# Patient Record
Sex: MALE | Race: WHITE | NOT HISPANIC OR LATINO | Employment: FULL TIME | URBAN - METROPOLITAN AREA
[De-identification: names, ages, dates, MRNs, and addresses within clinical notes are randomized per-mention and may not be internally consistent; named-entity substitution may affect disease eponyms.]

---

## 2023-08-24 ENCOUNTER — OFFICE VISIT (OUTPATIENT)
Dept: URGENT CARE | Facility: URGENT CARE | Age: 33
End: 2023-08-24
Payer: COMMERCIAL

## 2023-08-24 VITALS
OXYGEN SATURATION: 97 % | SYSTOLIC BLOOD PRESSURE: 134 MMHG | HEART RATE: 82 BPM | TEMPERATURE: 98.4 F | WEIGHT: 241 LBS | DIASTOLIC BLOOD PRESSURE: 76 MMHG

## 2023-08-24 DIAGNOSIS — J02.9 PHARYNGITIS, UNSPECIFIED ETIOLOGY: Primary | ICD-10-CM

## 2023-08-24 LAB
DEPRECATED S PYO AG THROAT QL EIA: NEGATIVE
GROUP A STREP BY PCR: NOT DETECTED

## 2023-08-24 PROCEDURE — 99203 OFFICE O/P NEW LOW 30 MIN: CPT

## 2023-08-24 PROCEDURE — 87651 STREP A DNA AMP PROBE: CPT

## 2023-08-24 RX ORDER — FLUTICASONE PROPIONATE AND SALMETEROL 250; 50 UG/1; UG/1
1 POWDER RESPIRATORY (INHALATION) EVERY 12 HOURS
COMMUNITY
Start: 2023-08-09 | End: 2023-11-10

## 2023-08-25 NOTE — PROGRESS NOTES
URGENT CARE  Assessment & Plan   Assessment:   Efren Rosas is a 32 year old male who's clinical presentation today is consistent with:   1. Pharyngitis, unspecified etiology  - Streptococcus A Rapid Screen w/Reflex to PCR  Plan:  Will treat patient with supportive and symptomatic measures for pharyngitis at this time which include: Fluids, rest, over-the-counter medications; , decongestants, antihistamines, and expectorants, side effects of medications reviewed. Educated patient that honey, warm fluids and gargling saltwater can also help  additionally tested for bacterial cause and rapid test was negative for streptococcal A; PCR test pending (updated pt results will come via mychart/call and rx will be reflexed if positive), additionally, educated patient to monitor their symptoms for improvement over the next week and to return for a follow up if the sore throat and/or tonsil swelling does not improve in the next 5-7 days. Educated patient on the warning signs of a peritonsillar abscess and to report to the emergency department if she notices any of these (trismus, dysphonia, uvular deviation, unilateral edema of peritonsillar region    No alarm signs or symptoms present   Differential Diagnoses for this patient's chief complaint that I considered include:  Bacterial vs viral etiology of pharyngitis, peritonsillar abscess, Tonsillitis, mono      Patient is agreeable to treatment plan and state they will follow-up if symptoms do not improve and/or if symptoms worsen   see patient's AVS 'monitor for' section for specific patient instructions given and discussed regarding what to watch for and when to follow up    NIRAJ [facility]unders [facility] URGENT CARE [place]      ______________________________________________________________________      Subjective     HPI: Efren Rosas  is a 32 year old  male who presents today for evaluation the following concerns:   Patient endorses a sore throat  today which started 3 days ago on 8/21/23   Patient reports they have been experiencing a sore throat and swollen lymph nodes    Patient  denies} a history of strep throat   Patient denies any fevers} sweats, chills, myalgias, wheezing, shortness of breath, difficulty breathing, chest pain, weakness or signs of dehydration   Patient denies any difficulty opening jaw, dysphonia/voice changes, uvular deviation, or unilateral edema of peritonsillar region    Review of Systems:  Pertinent review of systems as reflected in HPI, otherwise negative.     Objective    Physical Exam:  Vitals:    08/24/23 1849   BP: 134/76   Pulse: 82   Temp: 98.4  F (36.9  C)   TempSrc: Tympanic   SpO2: 97%   Weight: 109.3 kg (241 lb)      General: Alert and oriented, no acute distress, Vital signs reviewed: afebrile,  normotensive   Psy/mental status: Cooperative, nonanxious  SKIN: Intact, no rashes  EYES: EOMs intact, PERRLA bilaterally   Conjunctiva: Clear bilaterally, no injection or erythema present  EARS: TMs intact, translucent gray in color with normal landmarks present no erythema  or bulging tympanic membrane   Canals are without swelling, however have a mild amount of cerumen, no impaction  NOSE:  mucosa moist               No frontal or maxillary sinus tenderness present bilaterally  MOUTH/THROAT: lips, tongue, & oral mucosa appear normal upon inspection                Posterior oropharynx is erythematous but without exudate, lesions or tonsillar  Edema, no dysphonia, no unilateral tonsillar edema, no uvular deviation,   no signs of peritonsillar abscess  NECK: supple, has full range of motion with no meningeal signs              No lymphadenopathy present  LUNG: normal work of breathing, good respiratory effort without retractions, good air  movement, non labored, inspection reveals normal chest expansion w/  inspiration            Lung sounds are clear to auscultation bilaterally,            No rales/rhonic/crackles wheezing  noted           No cough noted      LABS:   Results for orders placed or performed in visit on 08/24/23   Streptococcus A Rapid Screen w/Reflex to PCR - Clinic Collect     Status: Normal    Specimen: Throat; Swab   Result Value Ref Range    Group A Strep antigen Negative Negative        ______________________________________________________________________    I explained my diagnostic considerations and recommendations to the patient, who voiced understanding and agreement with the treatment plan.   All questions were answered.   We discussed potential side effects, risks and benefits of any prescribed or recommended therapies, as well as expectations for response to treatments.  Please see AVS for any patient instructions & handouts given.   Patient was advised to contact the Nurse Care Line, their Primary Care provider, Urgent Care, or the Emergency Department if there are new or worsening symptoms, or call 911 for emergencies.

## 2023-08-27 ENCOUNTER — HEALTH MAINTENANCE LETTER (OUTPATIENT)
Age: 33
End: 2023-08-27

## 2023-09-06 ENCOUNTER — OFFICE VISIT (OUTPATIENT)
Dept: FAMILY MEDICINE | Facility: CLINIC | Age: 33
End: 2023-09-06
Payer: COMMERCIAL

## 2023-09-06 VITALS
DIASTOLIC BLOOD PRESSURE: 70 MMHG | SYSTOLIC BLOOD PRESSURE: 114 MMHG | RESPIRATION RATE: 16 BRPM | HEART RATE: 90 BPM | HEIGHT: 72 IN | TEMPERATURE: 98.6 F | BODY MASS INDEX: 32.51 KG/M2 | OXYGEN SATURATION: 98 % | WEIGHT: 240 LBS

## 2023-09-06 DIAGNOSIS — R53.83 OTHER FATIGUE: ICD-10-CM

## 2023-09-06 DIAGNOSIS — R06.83 SNORING: ICD-10-CM

## 2023-09-06 DIAGNOSIS — J02.9 PHARYNGITIS, UNSPECIFIED ETIOLOGY: ICD-10-CM

## 2023-09-06 DIAGNOSIS — Z00.00 WELL ADULT EXAM: Primary | ICD-10-CM

## 2023-09-06 LAB
ERYTHROCYTE [DISTWIDTH] IN BLOOD BY AUTOMATED COUNT: 13 % (ref 10–15)
HCT VFR BLD AUTO: 43.2 % (ref 40–53)
HGB BLD-MCNC: 14.4 G/DL (ref 13.3–17.7)
MCH RBC QN AUTO: 29.7 PG (ref 26.5–33)
MCHC RBC AUTO-ENTMCNC: 33.3 G/DL (ref 31.5–36.5)
MCV RBC AUTO: 89 FL (ref 78–100)
PLATELET # BLD AUTO: 248 10E3/UL (ref 150–450)
RBC # BLD AUTO: 4.85 10E6/UL (ref 4.4–5.9)
TSH SERPL DL<=0.005 MIU/L-ACNC: 1.61 UIU/ML (ref 0.3–4.2)
WBC # BLD AUTO: 10.6 10E3/UL (ref 4–11)

## 2023-09-06 PROCEDURE — 85027 COMPLETE CBC AUTOMATED: CPT | Performed by: FAMILY MEDICINE

## 2023-09-06 PROCEDURE — 36415 COLL VENOUS BLD VENIPUNCTURE: CPT | Performed by: FAMILY MEDICINE

## 2023-09-06 PROCEDURE — 84443 ASSAY THYROID STIM HORMONE: CPT | Performed by: FAMILY MEDICINE

## 2023-09-06 PROCEDURE — 99213 OFFICE O/P EST LOW 20 MIN: CPT | Mod: 25 | Performed by: FAMILY MEDICINE

## 2023-09-06 PROCEDURE — 99385 PREV VISIT NEW AGE 18-39: CPT | Performed by: FAMILY MEDICINE

## 2023-09-06 RX ORDER — AMOXICILLIN 500 MG/1
500 CAPSULE ORAL 3 TIMES DAILY
Qty: 30 CAPSULE | Refills: 0 | Status: SHIPPED | OUTPATIENT
Start: 2023-09-06 | End: 2023-12-11

## 2023-09-06 ASSESSMENT — ENCOUNTER SYMPTOMS
EYE PAIN: 0
JOINT SWELLING: 0
FEVER: 0
DIARRHEA: 0
NERVOUS/ANXIOUS: 0
DIZZINESS: 0
CHILLS: 0
HEARTBURN: 0
ARTHRALGIAS: 0
CONSTIPATION: 0
SHORTNESS OF BREATH: 0
HEADACHES: 0
HEMATOCHEZIA: 0
MYALGIAS: 0
DYSURIA: 0
PALPITATIONS: 0
COUGH: 0
ABDOMINAL PAIN: 0
WEAKNESS: 1
HEMATURIA: 0
FREQUENCY: 0
PARESTHESIAS: 0
NAUSEA: 0
SORE THROAT: 1

## 2023-09-06 ASSESSMENT — PAIN SCALES - GENERAL: PAINLEVEL: MODERATE PAIN (5)

## 2023-09-06 NOTE — NURSING NOTE
"Chief Complaint   Patient presents with    Physical       Initial /70   Pulse 90   Temp 98.6  F (37  C) (Tympanic)   Resp 16   Ht 1.816 m (5' 11.5\")   Wt 108.9 kg (240 lb)   SpO2 98%   BMI 33.01 kg/m   Estimated body mass index is 33.01 kg/m  as calculated from the following:    Height as of this encounter: 1.816 m (5' 11.5\").    Weight as of this encounter: 108.9 kg (240 lb).    Patient presents to the clinic using No DME    Is there anyone who you would like to be able to receive your results? No  If yes have patient fill out ALICIA      "

## 2023-09-06 NOTE — PROGRESS NOTES
"SUBJECTIVE:   CC: Efren is an 32 year old who presents for preventative health visit.       2023     5:07 PM   Additional Questions   Roomed by Yoselin BALLESTEROS   Accompanied by self       Healthy Habits:     Getting at least 3 servings of Calcium per day:  NO    Bi-annual eye exam:  Yes    Dental care twice a year:  NO    Sleep apnea or symptoms of sleep apnea:  Daytime drowsiness, Excessive snoring and Sleep apnea    Diet:  Regular (no restrictions)    Frequency of exercise:  1 day/week    Duration of exercise:  30-45 minutes    Taking medications regularly:  Yes    Additional concerns today:  Yes      Today's PHQ-2 Score:       2023     5:02 PM   PHQ-2 (  [employer])   Q1: Little interest or pleasure in doing things 0   Q2: Feeling down, depressed or hopeless 0   PHQ-2 Score 0   Q1: Little interest or pleasure in doing things Not at all   Q2: Feeling down, depressed or hopeless Not at all   PHQ-2 Score 0     Fatigue, snoring, apnea: wife notices.  Tired all the time.  Thinks sleep eval makes sense  Sore throat: R side.  Feels like it's \"full\" sometimes.  Hurts to swallow, comes and goes.  Strep neg in UC. Still persisting.  Sometimes pain with swallowing          : No, advance care planning information given to patient to review.  Patient plans to discuss their wishes with loved ones or provider.      Social History     Tobacco Use    Smoking status: Former     Packs/day: 0.25     Years: 4.00     Pack years: 1.00     Types: Cigarettes     Quit date: 2/15/2021     Years since quittin.5    Smokeless tobacco: Never   Substance Use Topics    Alcohol use: No         2023     5:02 PM   Alcohol Use   Prescreen: >3 drinks/day or >7 drinks/week? No          No data to display                Last PSA: No results found for: PSA    Reviewed orders with patient. Reviewed health maintenance and updated orders accordingly -       Reviewed and updated as needed this visit by clinical staff   Tobacco  Allergies  Meds " "             Reviewed and updated as needed this visit by Provider      Review of Systems   Constitutional:  Negative for chills and fever.   HENT:  Positive for sore throat. Negative for congestion, ear pain and hearing loss.    Eyes:  Negative for pain and visual disturbance.   Respiratory:  Negative for cough and shortness of breath.    Cardiovascular:  Negative for chest pain, palpitations and peripheral edema.   Gastrointestinal:  Negative for abdominal pain, constipation, diarrhea, heartburn, hematochezia and nausea.   Genitourinary:  Negative for dysuria, frequency, genital sores, hematuria, impotence, penile discharge and urgency.   Musculoskeletal:  Negative for arthralgias, joint swelling and myalgias.   Skin:  Negative for rash.   Neurological:  Positive for weakness. Negative for dizziness, headaches and paresthesias.   Psychiatric/Behavioral:  Negative for mood changes. The patient is not nervous/anxious.          OBJECTIVE:   /70   Pulse 90   Temp 98.6  F (37  C) (Tympanic)   Resp 16   Ht 1.816 m (5' 11.5\")   Wt 108.9 kg (240 lb)   SpO2 98%   BMI 33.01 kg/m    Gen: alert and oriented, in no acute distress, affect within normal limits  Neck: supple with no masses or nodes.  Some tenderness R side of neck, just off thyroid.  No obvious mass.    Throat: oropharynx clear, no exudate or tonsillar/palate asymmetry.    CV: RRR, no murmur  Lungs: clear bilaterally with good effort  Abd: nontender, no mass  Ext: no edema or lesions   Neuro: moving all extremities, gait normal, no focal deficts noted      ASSESSMENT/PLAN:   Well exam  Throat pain, nos  Fatigue, apnea    Cbc, TSH.  Empiric trial of amox.  Consider ENT if not improving    Reviewed lipids, met panel from a year ago, don';t need to repeat those        COUNSELING:   Reviewed preventive health counseling, as reflected in patient instructions       Regular exercise       Healthy diet/nutrition        He reports that he quit smoking about " 2 years ago. His smoking use included cigarettes. He has a 1.00 pack-year smoking history. He has never used smokeless tobacco.            Hola Marquez MD  [facility]

## 2023-11-09 DIAGNOSIS — J45.30 MILD PERSISTENT ASTHMA, UNSPECIFIED WHETHER COMPLICATED: Primary | ICD-10-CM

## 2023-11-09 NOTE — TELEPHONE ENCOUNTER
Routing refill request to provider for review/approval because:  Medication is reported/historical    Date of last OV with Dr. Hola Marquez 9/6/23  .    Julie Behrendt RN

## 2023-11-10 RX ORDER — FLUTICASONE PROPIONATE AND SALMETEROL 50; 250 UG/1; UG/1
POWDER RESPIRATORY (INHALATION)
Qty: 3 EACH | Refills: 3 | Status: SHIPPED | OUTPATIENT
Start: 2023-11-10 | End: 2024-09-05 | Stop reason: ALTCHOICE

## 2023-11-29 ENCOUNTER — HOSPITAL ENCOUNTER (OUTPATIENT)
Dept: CT IMAGING | Facility: CLINIC | Age: 33
Discharge: HOME | End: 2023-11-29
Attending: NURSE PRACTITIONER | Admitting: NURSE PRACTITIONER
Payer: COMMERCIAL

## 2023-11-29 ENCOUNTER — OFFICE VISIT (OUTPATIENT)
Dept: FAMILY MEDICINE | Facility: CLINIC | Age: 33
End: 2023-11-29
Payer: COMMERCIAL

## 2023-11-29 VITALS
BODY MASS INDEX: 32.51 KG/M2 | OXYGEN SATURATION: 97 % | WEIGHT: 240 LBS | HEART RATE: 84 BPM | TEMPERATURE: 98.4 F | HEIGHT: 72 IN | SYSTOLIC BLOOD PRESSURE: 128 MMHG | DIASTOLIC BLOOD PRESSURE: 84 MMHG | RESPIRATION RATE: 16 BRPM

## 2023-11-29 DIAGNOSIS — R31.9 HEMATURIA, UNSPECIFIED TYPE: Primary | ICD-10-CM

## 2023-11-29 LAB
ALBUMIN UR-MCNC: NEGATIVE MG/DL
APPEARANCE UR: CLEAR
BACTERIA #/AREA URNS HPF: ABNORMAL /HPF
BILIRUB UR QL STRIP: NEGATIVE
COLOR UR AUTO: YELLOW
GLUCOSE UR STRIP-MCNC: NEGATIVE MG/DL
HGB UR QL STRIP: ABNORMAL
KETONES UR STRIP-MCNC: NEGATIVE MG/DL
LEUKOCYTE ESTERASE UR QL STRIP: NEGATIVE
NITRATE UR QL: NEGATIVE
PH UR STRIP: 5.5 [PH] (ref 5–7)
RBC #/AREA URNS AUTO: ABNORMAL /HPF
SP GR UR STRIP: <=1.005 (ref 1–1.03)
SQUAMOUS #/AREA URNS AUTO: ABNORMAL /LPF
UROBILINOGEN UR STRIP-ACNC: 0.2 E.U./DL
WBC #/AREA URNS AUTO: ABNORMAL /HPF

## 2023-11-29 PROCEDURE — 99215 OFFICE O/P EST HI 40 MIN: CPT | Performed by: NURSE PRACTITIONER

## 2023-11-29 PROCEDURE — 74176 CT ABD & PELVIS W/O CONTRAST: CPT

## 2023-11-29 PROCEDURE — 81001 URINALYSIS AUTO W/SCOPE: CPT | Performed by: NURSE PRACTITIONER

## 2023-11-29 ASSESSMENT — PAIN SCALES - GENERAL: PAINLEVEL: MODERATE PAIN (5)

## 2023-11-29 ASSESSMENT — ASTHMA QUESTIONNAIRES: ACT_TOTALSCORE: 21

## 2023-11-29 NOTE — COMMUNITY RESOURCES LIST (ENGLISH)
11/29/2023   [facility]  N/A  For questions about this resource list or additional care needs, please contact your primary care clinic or care manager.  Phone: [phone]   Email: N/A   Address: [address] [place] [address]   Hours: N/A        Food and Nutrition       Food pantry  1  [employer] Distance: 1.44 miles      In-Person   [address] [place] [address]  Language: English  Hours: Sat 8:30 AM - 11:30 AM  Fees: Free   Phone: (103) 767-6614 Email: contact@[employer] Website: [email or web address]/     2  [employer] [facility] Distance: 2.12 miles      Pickup   [address] [place] [address]  Language: English  Hours: Mon 9:00 AM - 5:00 PM , Wed 9:00 AM - 5:00 PM , Fri 9:00 AM - 5:00 PM  Fees: Free   Phone: (281) 212-2756 Email: mail@[employer]yte Website: https://www.[employer]/our-work/food-access-and-equity/     SNAP application assistance  3  [facility] ([facility]) - [place] Office Distance: 2.16 miles      In-Person, Phone/Virtual   [address] [place] [address]  Language: English  Hours: Mon - Fri 8:00 AM - 4:30 PM  Fees: Free   Phone: (891) 634-2150 Email: ngoc@[facility].[employer] Website: https://www.[facility].org/agency-information     4  [facility] - [place] Family Investment Program (MFIP) - [place] Family Investment Program (MFIP) - SNAP application assistance Distance: 11.41 miles      In-Person, Phone/Virtual   [address] [address]  Language: English  Hours: Mon - Fri 8:00 AM - 4:30 PM  Fees: Free   Phone: (246) 950-5985 Email: jan@[employer].gov Website: https://www.[facility]./499/MFIP-Biennial-Service-Agreement-VCA-Plan          Important Numbers & Websites       Emergency Services   911  [facility]   311  Poison Control   (800)  760-9054  Suicide Prevention Lifeline   (715) 403-7366 (TALK)  Child Abuse Hotline   (190) 821-4035 (4-A-Child)  Sexual Assault Hotline   (917) 183-5618 (HOPE)  National Runaway Safeline   (566) 395-1733 (RUNAWAY)  All-Options Talkline   (702) 780-6240  Substance Abuse Referral   (768) 118-9823 (HELP)

## 2023-11-29 NOTE — COMMUNITY RESOURCES LIST (ENGLISH)
11/29/2023   [facility]  N/A  For questions about this resource list or additional care needs, please contact your primary care clinic or care manager.  Phone: [phone]   Email: N/A   Address: [address] [place] [address]   Hours: N/A        Food and Nutrition       Food pantry  1  [employer] Distance: 1.44 miles      In-Person   [address] [place] [address]  Language: English  Hours: Sat 8:30 AM - 11:30 AM  Fees: Free   Phone: (624) 182-5285 Email: contact@[employer] Website: http://[employer]Parenthoods/     2  [employer] Food [facility] Distance: 2.12 miles      Pickup   [address] [place] [address]  Language: English  Hours: Mon 9:00 AM - 5:00 PM , Wed 9:00 AM - 5:00 PM , Fri 9:00 AM - 5:00 PM  Fees: Free   Phone: (376) 444-1205 Email: mail@[employer]arenthoods Website: https://www.[employer]/our-work/food-access-and-equity/     SNAP application assistance  3  [facility] ([facility]) - [place] Office Distance: 2.16 miles      In-Person, Phone/Virtual   [address] [place] [address]  Language: English  Hours: Mon - Fri 8:00 AM - 4:30 PM  Fees: Free   Phone: (293) 932-9958 Email: ngoc@[facility].Parenthoods Website: https://www.[facility].org/agency-information     4  [facility] - [place] Family Investment Program (MFIP) - [place] Family Investment Program (MFIP) - SNAP application assistance Distance: 11.41 miles      In-Person, Phone/Virtual   [address] [address]  Language: English  Hours: Mon - Fri 8:00 AM - 4:30 PM  Fees: Free   Phone: (170) 505-4852 Email: jan@[facility].gov Website: https://www.[facility]./499/MFIP-Biennial-Service-Agreement-VCA-Plan          Important Numbers & Websites       Emergency Services   911  [facility] Services   311  Poison Control   (800)  488-2667  Suicide Prevention Lifeline   (281) 576-9062 (TALK)  Child Abuse Hotline   (892) 374-2670 (4-A-Child)  Sexual Assault Hotline   (830) 508-1518 (HOPE)  National Runaway Safeline   (437) 887-8910 (RUNAWAY)  All-Options Talkline   (244) 678-5240  Substance Abuse Referral   (741) 535-6420 (HELP)

## 2023-11-29 NOTE — PROGRESS NOTES
"  Assessment & Plan     Hematuria, unspecified type  Patient reports gross hematuria, however no red cells on micro today.  Given pain he was experiencing, stat CT was obtained to rule out nephrolithiasis.  CT is negative for stone or other intra-abdominal pathology, although I suspect he may have passed a stone.  There is a tiny abrasion at the urinary meatus, could be the source of his current pain.  UA is not consistent with infection.  Advised to using Vaseline at the tip of the penis.  He will let me know if things are not improving.  - UA Macroscopic with reflex to Microscopic and Culture - Clinic Collect  - UA Microscopic with Reflex to Culture  - CT Abdomen Pelvis w/o Contrast       BMI:   Estimated body mass index is 33.01 kg/m  as calculated from the following:    Height as of this encounter: 1.816 m (5' 11.5\").    Weight as of this encounter: 108.9 kg (240 lb).       See Patient Instructions    NIRAJ [facility]    Kelly Schwartz is a 32 year old, presenting for the following health issues:  Hematuria        11/29/2023     9:16 AM   Additional Questions   Roomed by Monica STAFFORD   Accompanied by self       History of Present Illness       Reason for visit:  Blood in urine  Symptom onset:  1-3 days ago  Symptom intensity:  Moderate  Symptom progression:  Worsening  Had these symptoms before:  No    He eats 2-3 servings of fruits and vegetables daily.He consumes 2 sweetened beverage(s) daily.He exercises with enough effort to increase his heart rate 30 to 60 minutes per day.  He exercises with enough effort to increase his heart rate 3 or less days per week.   He is taking medications regularly.       Also mentions he has had some abdominal pain, felt a stinging while he was urinating just one time.    Above HPI reviewed. Additionally, notes that yesterday he noticed blood in his underwear.  He subsequently had pain near the tip of his penis after urination.  He " "then continued to note blood in his urine over the past 24 hours.  He states that it feels like he is \"trying to pee past something that is blocked\".  He denies any flank or abdominal pain.  He has not had any fevers.  He denies penile discharge.  He denies concern for STI.          Review of Systems   Constitutional, HEENT, cardiovascular, pulmonary, gi and gu systems are negative, except as otherwise noted.      Objective    /84   Pulse 84   Temp 98.4  F (36.9  C) (Tympanic)   Resp 16   Ht 1.816 m (5' 11.5\")   Wt 108.9 kg (240 lb)   SpO2 97%   BMI 33.01 kg/m    Body mass index is 33.01 kg/m .  Physical Exam  Vitals and nursing note reviewed.   Constitutional:       Appearance: Normal appearance.   HENT:      Head: Normocephalic and atraumatic.      Mouth/Throat:      Mouth: Mucous membranes are moist.   Cardiovascular:      Rate and Rhythm: Normal rate.   Pulmonary:      Effort: Pulmonary effort is normal.   Abdominal:      Tenderness: There is no right CVA tenderness or left CVA tenderness.   Genitourinary:     Testes: Normal.         Right: Mass, tenderness, testicular hydrocele or varicocele not present. Right testis is descended.         Left: Mass, tenderness, swelling or varicocele not present.      Epididymis:      Right: Normal.      Left: Normal.      Comments: Small abrasion noted at urinary meatus  Musculoskeletal:      Cervical back: Neck supple.   Skin:     General: Skin is warm and dry.   Neurological:      General: No focal deficit present.      Mental Status: He is alert.   Psychiatric:         Mood and Affect: Mood normal.         Behavior: Behavior normal.            Results for orders placed or performed in visit on 11/29/23 (from the past 24 hour(s))   UA Macroscopic with reflex to Microscopic and Culture - Clinic Collect    Specimen: Urine, Midstream   Result Value Ref Range    Color Urine Yellow Colorless, Straw, Light Yellow, Yellow    Appearance Urine Clear Clear    Glucose " Urine Negative Negative mg/dL    Bilirubin Urine Negative Negative    Ketones Urine Negative Negative mg/dL    Specific Gravity Urine <=1.005 1.003 - 1.035    Blood Urine Trace (A) Negative    pH Urine 5.5 5.0 - 7.0    Protein Albumin Urine Negative Negative mg/dL    Urobilinogen Urine 0.2 0.2, 1.0 E.U./dL    Nitrite Urine Negative Negative    Leukocyte Esterase Urine Negative Negative   UA Microscopic with Reflex to Culture   Result Value Ref Range    Bacteria Urine Few (A) None Seen /HPF    RBC Urine 0-2 0-2 /HPF /HPF    WBC Urine None Seen 0-5 /HPF /HPF    Squamous Epithelials Urine Few (A) None Seen /LPF    Narrative    Urine Culture not indicated   CT Abdomen Pelvis w/o Contrast    Narrative    CT ABDOMEN AND PELVIS WITHOUT CONTRAST 11/29/2023 10:15 AM    CLINICAL HISTORY: Suspected kidney stone. Gross hematuria.    TECHNIQUE: CT scan of the abdomen and pelvis was performed without IV  contrast. Multiplanar reformats were obtained. Dose reduction  techniques were used.  CONTRAST: None.    COMPARISON: None.    FINDINGS:   LOWER CHEST: Normal.    HEPATOBILIARY: Diffuse hepatic steatosis. No significant mass. No bile  duct dilatation. No calcified gallstones.    PANCREAS: Normal.    SPLEEN: Normal.    ADRENAL GLANDS: Normal.    KIDNEYS/BLADDER: No significant mass, stones, or hydronephrosis.    BOWEL: No obstruction or inflammatory change. Appendix is absent.    LYMPH NODES: Normal.    VASCULATURE: Unremarkable.    PELVIC ORGANS: Normal.    OTHER: None.    MUSCULOSKELETAL: Normal.      Impression    IMPRESSION:   1.  No acute intra-abdominal or intrapelvic process.  2.  No KUB calculi or hydronephrosis.  3.  Hepatic steatosis.    EDWIN MCKEON MD         SYSTEM ID:  [id number]     CT Abdomen Pelvis w/o Contrast    Result Date: 11/29/2023  CT ABDOMEN AND PELVIS WITHOUT CONTRAST 11/29/2023 10:15 AM CLINICAL HISTORY: Suspected kidney stone. Gross hematuria. TECHNIQUE: CT scan of the abdomen and pelvis was performed  without IV contrast. Multiplanar reformats were obtained. Dose reduction techniques were used. CONTRAST: None. COMPARISON: None. FINDINGS: LOWER CHEST: Normal. HEPATOBILIARY: Diffuse hepatic steatosis. No significant mass. No bile duct dilatation. No calcified gallstones. PANCREAS: Normal. SPLEEN: Normal. ADRENAL GLANDS: Normal. KIDNEYS/BLADDER: No significant mass, stones, or hydronephrosis. BOWEL: No obstruction or inflammatory change. Appendix is absent. LYMPH NODES: Normal. VASCULATURE: Unremarkable. PELVIC ORGANS: Normal. OTHER: None. MUSCULOSKELETAL: Normal.     IMPRESSION: 1.  No acute intra-abdominal or intrapelvic process. 2.  No KUB calculi or hydronephrosis. 3.  Hepatic steatosis. EDWIN MCKEON MD   SYSTEM ID:  [id number]

## 2023-12-04 ASSESSMENT — SLEEP AND FATIGUE QUESTIONNAIRES
HOW LIKELY ARE YOU TO NOD OFF OR FALL ASLEEP WHILE SITTING AND READING: SLIGHT CHANCE OF DOZING
HOW LIKELY ARE YOU TO NOD OFF OR FALL ASLEEP WHILE SITTING INACTIVE IN A PUBLIC PLACE: WOULD NEVER DOZE
HOW LIKELY ARE YOU TO NOD OFF OR FALL ASLEEP WHILE WATCHING TV: MODERATE CHANCE OF DOZING
HOW LIKELY ARE YOU TO NOD OFF OR FALL ASLEEP IN A CAR, WHILE STOPPED FOR A FEW MINUTES IN TRAFFIC: WOULD NEVER DOZE
HOW LIKELY ARE YOU TO NOD OFF OR FALL ASLEEP WHILE SITTING QUIETLY AFTER LUNCH WITHOUT ALCOHOL: WOULD NEVER DOZE
HOW LIKELY ARE YOU TO NOD OFF OR FALL ASLEEP WHILE SITTING AND TALKING TO SOMEONE: WOULD NEVER DOZE
HOW LIKELY ARE YOU TO NOD OFF OR FALL ASLEEP WHEN YOU ARE A PASSENGER IN A CAR FOR AN HOUR WITHOUT A BREAK: SLIGHT CHANCE OF DOZING
HOW LIKELY ARE YOU TO NOD OFF OR FALL ASLEEP WHILE LYING DOWN TO REST IN THE AFTERNOON WHEN CIRCUMSTANCES PERMIT: SLIGHT CHANCE OF DOZING

## 2023-12-11 ENCOUNTER — OFFICE VISIT (OUTPATIENT)
Dept: SLEEP MEDICINE | Facility: CLINIC | Age: 33
End: 2023-12-11
Attending: FAMILY MEDICINE
Payer: COMMERCIAL

## 2023-12-11 VITALS
RESPIRATION RATE: 16 BRPM | HEART RATE: 84 BPM | BODY MASS INDEX: 32.91 KG/M2 | SYSTOLIC BLOOD PRESSURE: 123 MMHG | HEIGHT: 72 IN | DIASTOLIC BLOOD PRESSURE: 75 MMHG | WEIGHT: 243 LBS | OXYGEN SATURATION: 98 %

## 2023-12-11 DIAGNOSIS — G47.00 INSOMNIA, UNSPECIFIED TYPE: ICD-10-CM

## 2023-12-11 DIAGNOSIS — R29.818 SUSPECTED SLEEP APNEA: Primary | ICD-10-CM

## 2023-12-11 DIAGNOSIS — E66.811 OBESITY (BMI 30.0-34.9): ICD-10-CM

## 2023-12-11 DIAGNOSIS — R06.81 WITNESSED APNEIC SPELLS: ICD-10-CM

## 2023-12-11 DIAGNOSIS — R53.83 OTHER FATIGUE: ICD-10-CM

## 2023-12-11 DIAGNOSIS — R06.83 SNORING: ICD-10-CM

## 2023-12-11 PROBLEM — F17.200 TOBACCO USE DISORDER: Status: ACTIVE | Noted: 2023-12-11

## 2023-12-11 PROBLEM — F12.11 MARIJUANA ABUSE IN REMISSION: Status: ACTIVE | Noted: 2023-12-11

## 2023-12-11 PROBLEM — J30.9 ALLERGIC RHINITIS: Status: ACTIVE | Noted: 2023-12-11

## 2023-12-11 PROBLEM — H52.7 REFRACTIVE ERROR: Status: ACTIVE | Noted: 2023-12-11

## 2023-12-11 PROBLEM — J45.909 ASTHMA WITHOUT STATUS ASTHMATICUS: Status: ACTIVE | Noted: 2023-12-11

## 2023-12-11 PROBLEM — F43.22 ADJUSTMENT DISORDER WITH ANXIETY: Status: ACTIVE | Noted: 2023-12-11

## 2023-12-11 PROBLEM — M25.559 PAIN IN JOINT INVOLVING PELVIC REGION AND THIGH: Status: ACTIVE | Noted: 2023-12-11

## 2023-12-11 PROBLEM — H69.90 EUSTACHIAN TUBE DYSFUNCTION: Status: ACTIVE | Noted: 2023-12-11

## 2023-12-11 PROBLEM — M54.50 LOW BACK PAIN: Status: ACTIVE | Noted: 2023-12-11

## 2023-12-11 PROBLEM — F10.11 ALCOHOL ABUSE, IN REMISSION: Status: ACTIVE | Noted: 2023-12-11

## 2023-12-11 PROCEDURE — 99203 OFFICE O/P NEW LOW 30 MIN: CPT | Performed by: NURSE PRACTITIONER

## 2023-12-11 NOTE — PATIENT INSTRUCTIONS
"          MY TREATMENT INFORMATION FOR SLEEP APNEA-  Efren BALTAZAR Gratiot    DOCTOR : NIRAJ Prescott CNP    Am I having a sleep study at a sleep center?  --->Due to normal delays, you will be contacted within 2-4 weeks to schedule    Am I having a home sleep study?  --->Watch the video for the device you are using:    -/drop off device-   https://www.[employer].com/watch?v=yGGFBdELGhk    -Disposable device sent out require phone/computer application-   https://www.[employer].com/watch?v=BCce_vbiwxE      Frequently asked questions:  1. What is Obstructive Sleep Apnea (ROMAN)? ROMAN is the most common type of sleep apnea. Apnea means, \"without breath.\"  Apnea is most often caused by narrowing or collapse of the upper airway as muscles relax during sleep.   Almost everyone has occasional apneas. Most people with sleep apnea have had brief interruptions at night frequently for many years.  The severity of sleep apnea is related to how frequent and severe the events are.   2. What are the consequences of ROMAN? Symptoms include: feeling sleepy during the day, snoring loudly, gasping or stopping of breathing, trouble sleeping, and occasionally morning headaches or heartburn at night.  Sleepiness can be serious and even increase the risk of falling asleep while driving. Other health consequences may include development of high blood pressure and other cardiovascular disease in persons who are susceptible. Untreated ROMAN  can contribute to heart disease, stroke and diabetes.   3. What are the treatment options? In most situations, sleep apnea is a lifelong disease that must be managed with daily therapy. Medications are not effective for sleep apnea and surgery is generally not considered until other therapies have been tried. Your treatment is your choice . Continuous Positive Airway (CPAP) works right away and is the therapy that is effective in nearly everyone. An oral device to hold your jaw forward is usually the next " most reliable option. Other options include postioning devices (to keep you off your back), weight loss, and surgery including a tongue pacing device. There is more detail about some of these options below.  4. Are my sleep studies covered by insurance? Although we will request verification of coverage, we advise you also check in advance of the study to ensure there is coverage.    Important tips for those choosing CPAP and similar devices  For new devices, sign up for device CRISSY to monitor your device for your followup visits  We encourage you to utilize the [employer] crissy or website (myAir web (resmed.com) ) to monitor your therapy progress and share the data with your healthcare team when you discuss your sleep apnea.                                                    Know your equipment:  CPAP is continuous positive airway pressure that prevents obstructive sleep apnea by keeping the throat from collapsing while you are sleeping. In most cases, the device is  smart  and can slowly self-adjusts if your throat collapses and keeps a record every day of how well you are treated-this information is available to you and your care team.  BPAP is bilevel positive airway pressure that keeps your throat open and also assists each breath with a pressure boost to maintain adequate breathing.  Special kinds of BPAP are used in patients who have inadequate breathing from lung or heart disease. In most cases, the device is  smart  and can slowly self-adjusts to assist breathing. Like CPAP, the device keeps a record of how well you are treated.  Your mask is your connection to the device. You get to choose what feels most comfortable and the staff will help to make sure if fits. Here: are some examples of the different masks that are available: Magnetic mask aids may assist with use but there are safety issues that should be addressed when considering with magnets* ( see end of discussion).       Key points to remember on  your journey with sleep apnea:  Sleep study.  PAP devices often need to be adjusted during a sleep study to show that they are effective and adjusted right.  Good tips to remember: Try wearing just the mask during a quiet time during the day so your body adapts to wearing it. A humidifier is recommended for comfort in most cases to prevent drying of your nose and throat. Allergy medication from your provider may help you if you are having nasal congestion.  Getting settled-in. It takes more than one night for most of us to get used to wearing a mask. Try wearing just the mask during a quiet time during the day so your body adapts to wearing it. A humidifier is recommended for comfort in most cases. Our team will work with you carefully on the first day and will be in contact within 4 days and again at 2 and 4 weeks for advice and remote device adjustments. Your therapy is evaluated by the device each day.   Use it every night. The more you are able to sleep naturally for 7-8 hours, the more likely you will have good sleep and to prevent health risks or symptoms from sleep apnea. Even if you use it 4 hours it helps. Occasionally all of us are unable to use a medical therapy, in sleep apnea, it is not dangerous to miss one night.   Communicate. Call our skilled team on the number provided on the first day if your visit for problems that make it difficult to wear the device. Over 2 out of 3 patients can learn to wear the device long-term with help from our team. Remember to call our team or your sleep providers if you are unable to wear the device as we may have other solutions for those who cannot adapt to mask CPAP therapy. It is recommended that you sleep your sleep provider within the first 3 months and yearly after that if you are not having problems.   Use it for your health. We encourage use of CPAP masks during daytime quiet periods to allow your face and brain to adapt to the sensation of CPAP so that it will  be a more natural sensation to awaken to at night or during naps. This can be very useful during the first few weeks or months of adapting to CPAP though it does not help medically to wear CPAP during wakefulness and  should not be used as a strategy just to meet guidelines.  Take care of your equipment. Make sure you clean your mask and tubing using directions every day and that your filter and mask are replaced as recommended or if they are not working.     *Masks with magnets:  Updated Contraindications  Masks with magnetic components are contraindicated for use by patients where they, or anyone in close physical contact while using the mask, have the following:   Active medical implants that interact with magnets (i.e., pacemakers, implantable cardioverter defibrillators (ICD), neurostimulators, cerebrospinal fluid (CSF) shunts, insulin/infusion pumps)   Metallic implants/objects containing ferromagnetic material (i.e., aneurysm clips/flow disruption devices, embolic coils, stents, valves, electrodes, implants to restore hearing or balance with implanted magnets, ocular implants, metallic splinters in the eye)  Updated Warning  Keep the mask magnets at a safe distance of at least 6 inches (150 mm) away from implants or medical devices that may be adversely affected by magnetic interference. This warning applies to you or anyone in close physical contact with your mask. The magnets are in the frame and lower headgear clips, with a magnetic field strength of up to 400mT. When worn, they connect to secure the mask but may inadvertently detach while asleep.  Implants/medical devices, including those listed within contraindications, may be adversely affected if they change function under external magnetic fields or contain ferromagnetic materials that attract/repel to magnetic fields (some metallic implants, e.g., contact lenses with metal, dental implants, metallic cranial plates, screws, dwight hole covers, and bone  substitute devices). Consult your physician and  of your implant / other medical device for information on the potential adverse effects of magnetic fields.    BESIDES CPAP, WHAT OTHER THERAPIES ARE THERE?    Positioning Device  Positioning devices are generally used when sleep apnea is mild and only occurs on your back.This example shows a pillow that straps around the waist. It may be appropriate for those whose sleep study shows milder sleep apnea that occurs primarily when lying flat on one's back. Preliminary studies have shown benefit but effectiveness at home may need to be verified by a home sleep test. These devices are generally not covered by medical insurance.  Examples of devices that maintain sleeping on the back to prevent snoring and mild sleep apnea.    Belt type body positioner  http://[employer]/    Electronic reminder  http://nightshifttherapy.com/            Oral Appliance  What is oral appliance therapy?  An oral appliance device fits on your teeth at night like a retainer used after having braces. The device is made by a specialized dentist and requires several visits over 1-2 months before a manufactured device is made to fit your teeth and is adjusted to prevent your sleep apnea. Once an oral device is working properly, snoring should be improved. A home sleep test may be recommended at that time if to determine whether the sleep apnea is adequately treated.       Some things to remember:  -Oral devices are often, but not always, covered by your medical insurance. Be sure to check with your insurance provider.   -If you are referred for oral therapy, you will be given a list of specialized dentists to consider or you may choose to visit the Web site of the American Academy of Dental Sleep Medicine  -Oral devices are less likely to work if you have severe sleep apnea or are extremely overweight.     More detailed information  An oral appliance is a small acrylic device that fits  over the upper and lower teeth  (similar to a retainer or a mouth guard). This device slightly moves jaw forward, which moves the base of the tongue forward, opens the airway, improves breathing for effective treat snoring and obstructive sleep apnea in perhaps 7 out of 10 people .  The best working devices are custom-made by a dental device  after a mold is made of the teeth 1, 2, 3.  When is an oral appliance indicated?  Oral appliance therapy is recommended as a first-line treatment for patients with primary snoring, mild sleep apnea, and for patients with moderate sleep apnea who prefer appliance therapy to use of CPAP4, 5. Severity of sleep apnea is determined by sleep testing and is based on the number of respiratory events per hour of sleep.   How successful is oral appliance therapy?  The success rate of oral appliance therapy in patients with mild sleep apnea is 75-80% while in patients with moderate sleep apnea it is 50-70%. The chance of success in patients with severe sleep apnea is 40-50%. The research also shows that oral appliances have a beneficial effect on the cardiovascular health of ROMAN patients at the same magnitude as CPAP therapy7.  Oral appliances should be a second-line treatment in cases of severe sleep apnea, but if not completely successful then a combination therapy utilizing CPAP plus oral appliance therapy may be effective. Oral appliances tend to be effective in a broad range of patients although studies show that the patients who have the highest success are females, younger patients, those with milder disease, and less severe obesity. 3, 6.   Finding a dentist that practices dental sleep medicine  Specific training is available through the American Academy of Dental Sleep Medicine for dentists interested in working in the field of sleep. To find a dentist who is educated in the field of sleep and the use of oral appliances, near you, visit the Web site of the American  Academy of Dental Sleep Medicine.    References  1. Vanessa et al. Objectively measured vs self-reported compliance during oral appliance therapy for sleep-disordered breathing. Chest 2013; 144(5): 3339-5726.  2. Adam et al. Objective measurement of compliance during oral appliance therapy for sleep-disordered breathing. Thorax 2013; 68(1): 91-96.  3. Nydia, et al. Mandibular advancement devices in 620 men and women with ROMAN and snoring: tolerability and predictors of treatment success. Chest 2004; 125: 5766-6159.  4. Roldan et al. Oral appliances for snoring and ROMAN: a review. Sleep 2006; 29: 244-262.  5. Sen et al. Oral appliance treatment for ROMAN: an update. J Clin Sleep Med 2014; 10(2): 215-227.  6. No et al. Predictors of OSAH treatment outcome. J Dent Res 2007; 86: 3709-9824.      Weight Loss:    Weight loss is a long-term strategy that may improve sleep apnea in some patients.    Weight management is a personal decision and the decision should be based on your interest and the potential benefits.  If you are interested in exploring weight loss strategies, the following discussion covers the impact on weight loss on sleep apnea and the approaches that may be successful.    Being overweight does not necessarily mean you will have health consequences.  Those who have BMI over 35 or over 27 with existing medical conditions carries greater risk.   Weight loss decreases severity of sleep apnea in most people with obesity. For those with mild obesity who have developed snoring with weight gain, even 15-30 pound weight loss can improve and occasionally eliminate sleep apnea.  Structured and life-long dietary and health habits are necessary to lose weight and keep healthier weight levels.     Though there may be significant health benefits from weight loss, long-term weight loss is very difficult to achieve- studies show success with dietary management in less than 10% of people. In  addition, substantial weight loss may require years of dietary control and may be difficult if patients have severe obesity. In these cases, surgical management may be considered.  Finally, older individuals who have tolerated obesity without health complications may be less likely to benefit from weight loss strategies.      Your BMI is Body mass index is 33.42 kg/m .    What is BMI?  Body mass index (BMI) is one way to tell whether you are at a healthy weight, overweight, or obese. It measures your weight in relation to your height.  A BMI of 18.5 to 24.9 is in the healthy range. A person with a BMI of 25 to 29.9 is considered overweight, and someone with a BMI of 30 or greater is considered obese.  Another way to find out if you are at risk for health problems caused by overweight and obesity is to measure your waist. If you are a woman and your waist is more than 35 inches, or if you are a man and your waist is more than 40 inches, your risk of disease may be higher.  More than two-thirds of American adults are considered overweight or obese. Being overweight or obese increases the risk for further weight gain.  Excess weight may lead to heart disease and diabetes. Creating and following plans for healthy eating and physical activity may help you improve your health.    Methods for maintaining or losing weight.  Weight control is part of healthy lifestyle and includes exercise, emotional health, and healthy eating habits.  Careful eating habits lifelong is the mainstay of weight control.  Though there are significant health benefits from weight loss, long-term weight loss with diet alone may be very difficult to achieve- studies show long-term success with dietary management in less than 10% of people. Attaining a healthy weight may be especially difficult to achieve in those with severe obesity. In some cases, medications, devices and surgical management might be considered.    What can you do?  If you are  overweight or obese and are interested in methods for weight loss, you should discuss this with your provider. In addition, we recommend that you review healthy life styles and methods for weight loss available through the National Institutes of Health patient information sites:   http://win.niddk.nih.gov/publications/index.htm    Surgery:    Surgery for obstructive sleep apnea is considered generally only when other therapies fail to work. Surgery may be discussed with you if you are having a difficult time tolerating CPAP and or when there is an abnormal structure that requires surgical correction.  Nose and throat surgeries often enlarge the airway to prevent collapse.  Most of these surgeries create pain for 1-2 weeks and up to half of the most common surgeries are not effective throughout life.  You should carefully discuss the benefits and drawbacks to surgery with your sleep provider and surgeon to determine if it is the best solution for you.   More information  Surgery for ROMAN is directed at areas that are responsible for narrowing or complete obstruction of the airway during sleep.  There are a wide range of procedures available to enlarge and/or stabilize the airway to prevent blockage of breathing in the three major areas where it can occur: the palate, tongue, and nasal regions.  Successful surgical treatment depends on the accurate identification of the factors responsible for obstructive sleep apnea in each person.  A personalized approach is required because there is no single treatment that works well for everyone.  Because of anatomic variation, consultation with an examination by a sleep surgeon is a critical first step in determining what surgical options are best for each patient.  In some cases, examination during sedation may be recommended in order to guide the selection of procedures.  Patients will be counseled about risks and benefits as well as the typical recovery course after surgery.  Surgery is typically not a cure for a person s ROMAN.  However, surgery will often significantly improve one s ROMAN severity (termed  success rate ).  Even in the absence of a cure, surgery will decrease the cardiovascular risk associated with OSA7; improve overall quality of life8 (sleepiness, functionality, sleep quality, etc).      Palate Procedures:  Patients with ROMAN often have narrowing of their airway in the region of their tonsils and uvula.  The goals of palate procedures are to widen the airway in this region as well as to help the tissues resist collapse.  Modern palate procedure techniques focus on tissue conservation and soft tissue rearrangement, rather than tissue removal.  Often the uvula is preserved in this procedure. Residual sleep apnea is common in patient after pharyngoplasty with an average reduction in sleep apnea events of 33%2.      Tongue Procedures:  ExamWhile patients are awake, the muscles that surround the throat are active and keep this region open for breathing. These muscles relax during sleep, allowing the tongue and other structures to collapse and block breathing.  There are several different tongue procedures available.  Selection of a tongue base procedure depends on characteristics seen on physical exam.  Generally, procedures are aimed at removing bulky tissues in this area or preventing the back of the tongue from falling back during sleep.  Success rates for tongue surgery range from 50-62%3.    Hypoglossal Nerve Stimulation:  Hypoglossal nerve stimulation has recently received approval from the [place] Food and Drug Administration for the treatment of obstructive sleep apnea.  This is based on research showing that the system was safe and effective in treating sleep apnea6.  Results showed that the median AHI score decreased 68%, from 29.3 to 9.0. This therapy uses an implant system that senses breathing patterns and delivers mild stimulation to airway muscles, which  keeps the airway open during sleep.  The system consists of three fully implanted components: a small generator (similar in size to a pacemaker), a breathing sensor, and a stimulation lead.  Using a small handheld remote, a patient turns the therapy on before bed and off upon awakening.    Candidates for this device must be greater than 18 years of age, have moderate to severe ROMAN (AHI between 15-65), BMI less than 35, have tried CPAP/oral appliance for at least 8 weeks without success, and have appropriate upper airway anatomy (determined by a sleep endoscopy performed by Dr. Eddi Rizzo).    Hypoglossal Nerve Stimulation Pathway:    The sleep surgeon s office will work with the patient through the insurance prior-authorization process (including communications and appeals).    Nasal Procedures:  Nasal obstruction can interfere with nasal breathing during the day and night.  Studies have shown that relief of nasal obstruction can improve the ability of some patients to tolerate positive airway pressure therapy for obstructive sleep apnea1.  Treatment options include medications such as nasal saline, topical corticosteroid and antihistamine sprays, and oral medications such as antihistamines or decongestants. Non-surgical treatments can include external nasal dilators for selected patients. If these are not successful by themselves, surgery can improve the nasal airway either alone or in combination with these other options.      Combination Procedures:  Combination of surgical procedures and other treatments may be recommended, particularly if patients have more than one area of narrowing or persistent positional disease.  The success rate of combination surgery ranges from 66-80%2,3.    References  Abril FARRAR. The Role of the Nose in Snoring and Obstructive Sleep Apnoea: An Update.  Eur Arch Otorhinolaryngol. 2011; 268: 1365-73.   Rene SM; Ami JA; Darío SMITH; Pallanch JF; Qamar CARLISLE; Susan KELLY; Alia RIZO.  Surgical modifications of the upper airway for obstructive sleep apnea in adults: a systematic review and meta-analysis. SLEEP 2010;33(10):1446-5452. Bharath GARCIA. Hypopharyngeal surgery in obstructive sleep apnea: an evidence-based medicine review.  Arch Otolaryngol Head Neck Surg. 2006 Feb;132(2):206-13.  Henri YH1, Humberto Y, Jm APOLINAR. The efficacy of anatomically based multilevel surgery for obstructive sleep apnea. Otolaryngol Head Neck Surg. 2003 Oct;129(4):327-35.  Kezirian E, Goldberg A. Hypopharyngeal Surgery in Obstructive Sleep Apnea: An Evidence-Based Medicine Review. Arch Otolaryngol Head Neck Surg. 2006 Feb;132(2):206-13.  Maeve BELLAMY et al. Upper-Airway Stimulation for Obstructive Sleep Apnea.  N Engl J Med. 2014 Jan 9;370(2):139-49.  uJlieth Y et al. Increased Incidence of Cardiovascular Disease in Middle-aged Men with Obstructive Sleep Apnea. Am J Respir Crit Care Med; 2002 166: 159-165  Santiago EM et al. Studying Life Effects and Effectiveness of Palatopharyngoplasty (SLEEP) study: Subjective Outcomes of Isolated Uvulopalatopharyngoplasty. Otolaryngol Head Neck Surg. 2011; 144: 623-631.        WHAT IF I ONLY HAVE SNORING?    Mandibular advancement devices, lateral sleep positioning, long-term weight loss and treatment of nasal allergies have been shown to improve snoring.  Exercising tongue muscles with a game (https://[employer].UYA[employer]/us/crissy/soundly-reduce-snoring/lf0267859098) or stimulating the tongue during the day with a device (https://doi.org/10.3390/vop70003375) have improved snoring in some individuals.    Remember to Drive Safe... Drive Alive     Sleep health profoundly affects your health, mood, and your safety.  Thirty three percent of the population (one in three of us) is not getting enough sleep and many have a sleep disorder. Not getting enough sleep or having an untreated / undertreated sleep condition may make us sleepy without even knowing it. In fact, our driving could be dramatically  impaired due to our sleep health. As your provider, here are some things I would like you to know about driving:     Here are some warning signs for impairment and dangerous drowsy driving:              -Having been awake more than 16 hours               -Looking tired               -Eyelid drooping              -Head nodding (it could be too late at this point)              -Driving for more than 30 minutes     Some things you could do to make the driving safer if you are experiencing some drowsiness:              -Stop driving and rest              -Call for transportation              -Make sure your sleep disorder is adequately treated     Some things that have been shown NOT to work when experiencing drowsiness while driving:              -Turning on the radio              -Opening windows              -Eating any  distracting  /  entertaining  foods (e.g., sunflower seeds, candy, or any other)              -Talking on the phone      Your decision may not only impact your life, but also the life of others. Please, remember to drive safe for yourself and all of us.

## 2023-12-11 NOTE — PROGRESS NOTES
Outpatient Sleep Medicine Consultation:      Name: Efren Rosas MRN# [id number]   Age: 32 year old YOB: 1990     Date of Consultation: December 11, 2023  Consultation is requested by: Hola Marquez MD  [address] [place] [id number] Hola Marquez  Primary care provider: Hola Marquez       Reason for Sleep Consult:     Efren Rosas is sent by Hola Marquez for a sleep consultation regarding snoring, witnessed apnea, daytime somnolence, possible ROMAN.    Patient s Reason for visit  Efren Rosas main reason for visit: Waking up not breathing multiple times a night, severe snoring.  Patient states problem(s) started: Was mild before but has gotten worse in the last year  Efren Rosas's goals for this visit: Come up with action plan to combat root issues           Assessment and Plan:     Summary Sleep Diagnoses:  1. Suspected sleep apnea  2. Snoring  3. Witnessed apneic spells  4. Other fatigue  5. Insomnia, unspecified type  6. Obesity (BMI 30.0-34.9)  - Adult Sleep Eval & Management  Referral  - HST-Home Sleep Apnea Test - Noxturnal Returnable; Future      Comorbid Diagnoses:  1.  Allergic rhinitis  2.  Asthma  3.  Eustachian tube dysfunction  4.  Low back pain  5.  Adjustment disorder with anxiety  6.  Tobacco use disorder  7.  Marijuana abuse-in remission  8.  Alcohol abuse-in remission      Summary Recommendations:  1.  Recommend further evaluation with home sleep apnea testing (HST) for possible sleep disordered breathing.  He has a high pretest probability of ROAMN with symptoms of loud snoring, witnessed apneas, difficulty staying asleep, problems waking up too early, morning headaches, and poorly restful sleep for the last few years.  His STOP-BANG score is 4 today which suggests a high risk of ROMAN using the enhanced version of the questionnaire.  His symptoms are consistent with probable obstructive sleep apnea.  2.  We discussed the pathophysiology of  obstructive sleep apnea and the risks associated with untreated ROMAN.  We also discussed the pros and cons of in lab polysomnography versus home sleep testing.  The patient has elected to undergo home sleep testing (HST) to further evaluate his symptoms of possible obstructive sleep apnea.  3.  All potential therapeutic options including positive airway pressure, mandibular advancing oral appliances, positional therapy, and surgical options were discussed. Also counseled about impact of weight loss on ROMAN.   4.  Follow-up in approximately 2 weeks after the sleep study to review the results and to determine next steps.    Orders Placed This Encounter   Procedures    HST-Home Sleep Apnea Test - Noxturnal Returnable         Summary Counseling:    Sleep Testing Reviewed  Obstructive Sleep Apnea Reviewed  Complications of Untreated Sleep Apnea Reviewed  Previous recent chart notes, lab, imaging results reviewed    Medical Decision-making:   Educational materials provided in instructions    Total time spent reviewing medical records, history and physical examination, review of previous testing and interpretation as well as documentation on this date: 41 minutes    CC: Hola Marquez          History of Present Illness:     Efren Rosas is a 32-year-old male with a PMH pertinent for allergic rhinitis, asthma, eustachian tube dysfunction, low back pain, adjustment disorder with anxiety, tobacco use disorder, marijuana abuse-in remission, alcohol abuse-in remission, and obesity who presents today with symptoms of snoring, witnessed apneas, difficulty staying asleep, morning headaches, heartburn/reflux at night, and poorly restful sleep for a few years and getting worse. He was referred by his PCP for this sleep consultation.    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  Wakes up due to Snorting self awake;External stimuli (bed partner, pets, noise, etc)  He has trouble falling back asleep 5 times a week times a week.   It usually takes 5 to 10 minutes most of the time. Other times almost an hour to get back to sleep  Patient is usually up at 4am on work days and 6am when im not working  Uses alarm: Yes    Weekends/Non-work Days/All Other Days:  Usually gets into bed at 10pm   Takes patient about 30 minutes to fall asleep  Patient is usually up at 6am  Uses alarm: No    Sleep Need  Patient gets  4 or 5 hours of good sleep sleep on average   Patient thinks he needs about Would like to get 7 hours of sleep sleep    Efren Rosas prefers to sleep in this position(s): Back;Side   Patient states they do the following activities in bed: Watch TV;Use phone, computer, or tablet    Naps  Patient takes a purposeful nap 0 times a week and naps are usually 0 in duration  He feels better after a nap: No  He dozes off unintentionally 3 or 4 times days per week  Patient has had a driving accident or near-miss due to sleepiness/drowsiness: No      SLEEP DISRUPTIONS:    Breathing/Snoring  Patient snores:Yes  Other people complain about his snoring: Yes  Patient has been told he stops breathing in his sleep:Yes  He has issues with the following: Morning headaches;Morning mouth dryness;Stuffy nose when you wake up;Heartburn or reflux at night    Movement:  Patient gets pain, discomfort, with an urge to move:  No  It happens when he is resting:  No  It happens more at night:  No  Patient has been told he kicks his legs at night:  No     Behaviors in Sleep:  Efren Rosas has experienced the following behaviors while sleeping: Sleep-talking;Kicking or punching;Waking up paralyzed - random but may occur a couple of times that week and then not recur for a long  time.  He has experienced sudden muscle weakness during the day: Yes - random muscle spasms, denies cataplexy      Is there anything else you would like your sleep provider to know:        CAFFEINE AND OTHER SUBSTANCES:    Patient consumes caffeinated beverages per day:  2 on average  Last caffeine use is usually: 4pm  List of any prescribed or over the counter stimulants that patient takes:    List of any prescribed or over the counter sleep medication patient takes: None currently  List of previous sleep medications that patient has tried: Melatonin  Patient drinks alcohol to help them sleep: No  Patient drinks alcohol near bedtime: No    Family History:  Patient has a family member been diagnosed with a sleep disorder: No            SCALES:    EPWORTH SLEEPINESS SCALE         12/4/2023     9:37 AM    Delmar Sleepiness Scale ( ALEA Borges  [id number]<br>ESS - [place]/English - Final version - 21 Nov 07 - [facility] [place].)   Sitting and reading Slight chance of dozing   Watching TV Moderate chance of dozing   Sitting, inactive in a public place (e.g. a theatre or a meeting) Would never doze   As a passenger in a car for an hour without a break Slight chance of dozing   Lying down to rest in the afternoon when circumstances permit Slight chance of dozing   Sitting and talking to someone Would never doze   Sitting quietly after a lunch without alcohol Would never doze   In a car, while stopped for a few minutes in traffic Would never doze   Delmar Score (MC) 5   Delmar Score (Sleep) 5         INSOMNIA SEVERITY INDEX (FAWN)          12/4/2023     9:17 AM   Insomnia Severity Index (FAWN)   Difficulty falling asleep 1   Difficulty staying asleep 3   Problems waking up too early 3   How SATISFIED/DISSATISFIED are you with your CURRENT sleep pattern? 4   How NOTICEABLE to others do you think your sleep problem is in terms of impairing the quality of your life? 3   How WORRIED/DISTRESSED are you about your current sleep  "problem? 4   To what extent do you consider your sleep problem to INTERFERE with your daily functioning (e.g. daytime fatigue, mood, ability to function at work/daily chores, concentration, memory, mood, etc.) CURRENTLY? 4   FAWN Total Score 22       Guidelines for Scoring/Interpretation:  Total score categories:  0-7 = No clinically significant insomnia   8-14 = Subthreshold insomnia   15-21 = Clinical insomnia (moderate severity)  22-28 = Clinical insomnia (severe)  Used via courtesy of www.ActXealth.va.gov with permission from Kyree Kilpatrick PhD., [facility]      STOP BANG score: 4        12/11/2023     8:14 AM   STOP BANG Questionnaire (  2008, the American Society of Anesthesiologists, Inc. [employer]   Neck Cir (cm) Clinic: 44 cm   B/P Clinic: 123/75   BMI Clinic: 33.42         GAD7         No data to display                  CAGE-AID         No data to display                CAGE-AID reprinted with permission from the [place] Medical Journal, ELICIA Kim. and ALEXIA Bernal, \"Conjoint screening questionnaires for alcohol and drug abuse\" [place] Medical Journal 94: 135-140, 1995.      PATIENT HEALTH QUESTIONNAIRE-9 (PHQ - 9)         No data to display                Developed by Lindsey Velasquez, Tara Guajardo, Max Koch and colleagues, with an educational shahram from [employer] No permission required to reproduce, translate, display or distribute.        Allergies:    No Known Allergies    Medications:    Current Outpatient Medications   Medication Sig Dispense Refill    fluticasone-salmeterol (ADVAIR DISKUS) 250-50 MCG/ACT inhaler USE 1 INHALATION EVERY 12 HOURS 3 each 3    IBUPROFEN 200 200 MG OR TABS          Problem List:  Patient Active Problem List    Diagnosis Date Noted    Asthma without status asthmaticus 12/11/2023     Priority: Medium     Sep 08, 2010 Entered By: JULISA HENNESSY Comment: PFTS done in 2008 - normal.      Tobacco use disorder 12/11/2023 " "    Priority: Medium    Refractive error 2023     Priority: Medium    Pain in joint involving pelvic region and thigh 2023     Priority: Medium     Sep 08, 2010 Entered By: JULISA HENNESSY Comment: bilateral hip pain      Marijuana abuse in remission 2023     Priority: Medium    Low back pain 2023     Priority: Medium    Eustachian tube dysfunction 2023     Priority: Medium     continue present medications      Allergic rhinitis 2023     Priority: Medium    Alcohol abuse, in remission 2023     Priority: Medium    Adjustment disorder with anxiety 2023     Priority: Medium    Smoker 10/27/2015     Priority: Medium     Formatting of this note might be different from the original. started at age 18, max 2 packs per day, quit x 1 year,  trying to quit, quit before \"cold turkey\"      CARDIOVASCULAR SCREENING; LDL GOAL LESS THAN 160 10/31/2010     Priority: Medium        Past Medical/Surgical History:  Past Medical History:   Diagnosis Date    Allergic rhinitis, cause unspecified     Attention deficit disorder without mention of hyperactivity      Past Surgical History:   Procedure Laterality Date    NO HISTORY OF SURGERY         Social History:  Social History     Socioeconomic History    Marital status:      Spouse name: Not on file    Number of children: Not on file    Years of education: Not on file    Highest education level: Not on file   Occupational History    Not on file   Tobacco Use    Smoking status: Former     Packs/day: 0.25     Years: 4.00     Additional pack years: 0.00     Total pack years: 1.00     Types: Cigarettes     Quit date: 2/15/2021     Years since quittin.8    Smokeless tobacco: Never   Vaping Use    Vaping Use: Never used   Substance and Sexual Activity    Alcohol use: No    Drug use: No    Sexual activity: Not on file   Other Topics Concern    Parent/sibling w/ CABG, MI or angioplasty before 65F 55M? Not Asked   Social History " Narrative    Not on file     Social Determinants of Health     Financial Resource Strain: Low Risk  (11/29/2023)    Financial Resource Strain     Within the past 12 months, have you or your family members you live with been unable to get utilities (heat, electricity) when it was really needed?: No   Food Insecurity: High Risk (11/29/2023)    Food Insecurity     Within the past 12 months, did you worry that your food would run out before you got money to buy more?: Yes     Within the past 12 months, did the food you bought just not last and you didn t have money to get more?: Yes   Transportation Needs: Low Risk  (11/29/2023)    Transportation Needs     Within the past 12 months, has lack of transportation kept you from medical appointments, getting your medicines, non-medical meetings or appointments, work, or from getting things that you need?: No   Physical Activity: Not on file   Stress: Not on file   Social Connections: Not on file   Interpersonal Safety: Low Risk  (11/29/2023)    Interpersonal Safety     Do you feel physically and emotionally safe where you currently live?: Yes     Within the past 12 months, have you been hit, slapped, kicked or otherwise physically hurt by someone?: No     Within the past 12 months, have you been humiliated or emotionally abused in other ways by your partner or ex-partner?: No   Housing Stability: Low Risk  (11/29/2023)    Housing Stability     Do you have housing? : Yes     Are you worried about losing your housing?: No       Family History:  Family History   Problem Relation Age of Onset    Family History Negative No family hx of     Diabetes No family hx of     Cerebrovascular Disease No family hx of     Hypertension No family hx of     Breast Cancer No family hx of     Cancer - colorectal No family hx of     Prostate Cancer No family hx of     C.A.D. No family hx of     Lipids Father     Cancer Paternal Grandmother        Review of Systems:  A complete review of systems  "reviewed by me is negative with the exeption of what has been mentioned in the history of present illness.  In the last TWO WEEKS have you experienced any of the following symptoms?  Fevers: Yes  Night Sweats: Yes  Weight Gain: No  Pain at Night: Yes  Double Vision: No  Changes in Vision: No  Difficulty Breathing through Nose: Yes  Sore Throat in Morning: Yes  Dry Mouth in the Morning: Yes  Shortness of Breath Lying Flat: No  Shortness of Breath With Activity: Yes  Awakening with Shortness of Breath: No  Increased Cough: No  Heart Racing at Night: No  Swelling in Feet or Legs: No  Diarrhea at Night: No  Heartburn at Night: Yes  Urinating More than Once at Night: No  Losing Control of Urine at Night: No  Joint Pains at Night: Yes  Headaches in Morning: Yes  Weakness in Arms or Legs: Yes  Depressed Mood: No  Anxiety: Yes     Physical Examination:  Vitals: /75   Pulse 84   Resp 16   Ht 1.816 m (5' 11.5\")   Wt 110.2 kg (243 lb)   SpO2 98%   BMI 33.42 kg/m    BMI= Body mass index is 33.42 kg/m .    Neck Cir (cm): 44 cm      GENERAL APPEARANCE: healthy, alert, no distress, cooperative, and wearing a beard  EYES: Eyes grossly normal to inspection, PERRL, conjunctivae and sclerae normal, lids and lashes normal, and wearing eyeglasses  HENT: nose and mouth without ulcers or lesions, oropharynx crowded, uvula elongated, tongue base enlarged, oral mucous membranes moist, tonsillar hypertrophy, and normal cephalic/atraumatic  NECK: no adenopathy, no asymmetry, masses, or scars, thyroid normal to palpation, and trachea midline and normal to palpation  RESP: lungs clear to auscultation - no rales, rhonchi or wheezes  CV: regular rates and rhythm, normal S1 S2, no S3 or S4, and no murmur, click or rub  ABDOMEN: bowel sounds normal, obese, and soft, non-tender  MS: extremities normal- no gross deformities noted  NEURO: Alert and oriented x 3, normal strength and tone, mentation intact, and speech normal  PSYCH: " "mentation appears normal and affect normal/bright  Mallampati Class: III.  Tonsillar Stage: 3  extending beyond pillars.         Data: All pertinent previous laboratory data reviewed     No results for input(s): \"NA\", \"POTASSIUM\", \"CHLORIDE\", \"CO2\", \"ANIONGAP\", \"GLC\", \"BUN\", \"CR\", \"RONNY\" in the last 49295 hours.    Recent Labs   Lab Test 09/06/23  1759   WBC 10.6   RBC 4.85   HGB 14.4   HCT 43.2   MCV 89   MCH 29.7   MCHC 33.3   RDW 13.0          No results for input(s): \"PROTTOTAL\", \"ALBUMIN\", \"BILITOTAL\", \"ALKPHOS\", \"AST\", \"ALT\", \"BILIDIRECT\" in the last 81729 hours.    TSH (uIU/mL)   Date Value   09/06/2023 1.61       No results found for: \"UAMP\", \"UBARB\", \"BENZODIAZEUR\", \"UCANN\", \"UCOC\", \"OPIT\", \"UPCP\"    No results found for: \"IRONSAT\", \"BD82265\", \"ROSALINDA\"    No results found for: \"PH\", \"PHARTERIAL\", \"PO2\", \"AC2HSRBRYOL\", \"SAT\", \"PCO2\", \"HCO3\", \"BASEEXCESS\", \"BRIELLE\", \"BEB\"    @LABRCNTIPR(phv:4,pco2v:4,po2v:4,hco3v:4,angelique:4,o2per:4)@    Echocardiology: No results found for this or any previous visit (from the past 4320 hour(s)).    Chest x-ray: No results found for this or any previous visit from the past 365 days.      Chest CT: No results found for this or any previous visit from the past 365 days.      PFT: Most Recent Breeze Pulmonary Function Testing    No results found for: \"20001\"  No results found for: \"20002\"  No results found for: \"20003\"  No results found for: \"20015\"  No results found for: \"20016\"  No results found for: \"20027\"  No results found for: \"20028\"  No results found for: \"20029\"  No results found for: \"20079\"  No results found for: \"20080\"  No results found for: \"20081\"  No results found for: \"20335\"  No results found for: \"20105\"  No results found for: \"20053\"  No results found for: \"20054\"  No results found for: \"20055\"      NIRAJ Prescott CNP 12/11/2023   Sleep Medicine      This note was written with the assistance of the Dragon voice-dictation technology software. The final " document, although reviewed, may contain errors. For corrections, please contact the office.

## 2024-01-18 ASSESSMENT — SLEEP AND FATIGUE QUESTIONNAIRES
HOW LIKELY ARE YOU TO NOD OFF OR FALL ASLEEP WHILE SITTING AND READING: SLIGHT CHANCE OF DOZING
HOW LIKELY ARE YOU TO NOD OFF OR FALL ASLEEP WHILE SITTING AND TALKING TO SOMEONE: WOULD NEVER DOZE
HOW LIKELY ARE YOU TO NOD OFF OR FALL ASLEEP WHILE WATCHING TV: MODERATE CHANCE OF DOZING
HOW LIKELY ARE YOU TO NOD OFF OR FALL ASLEEP WHEN YOU ARE A PASSENGER IN A CAR FOR AN HOUR WITHOUT A BREAK: MODERATE CHANCE OF DOZING
HOW LIKELY ARE YOU TO NOD OFF OR FALL ASLEEP WHILE LYING DOWN TO REST IN THE AFTERNOON WHEN CIRCUMSTANCES PERMIT: HIGH CHANCE OF DOZING
HOW LIKELY ARE YOU TO NOD OFF OR FALL ASLEEP WHILE SITTING INACTIVE IN A PUBLIC PLACE: SLIGHT CHANCE OF DOZING
HOW LIKELY ARE YOU TO NOD OFF OR FALL ASLEEP IN A CAR, WHILE STOPPED FOR A FEW MINUTES IN TRAFFIC: SLIGHT CHANCE OF DOZING
HOW LIKELY ARE YOU TO NOD OFF OR FALL ASLEEP WHILE SITTING QUIETLY AFTER LUNCH WITHOUT ALCOHOL: WOULD NEVER DOZE

## 2024-01-24 ENCOUNTER — OFFICE VISIT (OUTPATIENT)
Dept: SLEEP MEDICINE | Facility: CLINIC | Age: 34
End: 2024-01-24
Payer: COMMERCIAL

## 2024-01-24 DIAGNOSIS — R29.818 SUSPECTED SLEEP APNEA: ICD-10-CM

## 2024-01-24 DIAGNOSIS — G47.00 INSOMNIA, UNSPECIFIED TYPE: ICD-10-CM

## 2024-01-24 DIAGNOSIS — E66.811 OBESITY (BMI 30.0-34.9): ICD-10-CM

## 2024-01-24 DIAGNOSIS — R06.81 WITNESSED APNEIC SPELLS: ICD-10-CM

## 2024-01-24 DIAGNOSIS — R06.83 SNORING: ICD-10-CM

## 2024-01-24 DIAGNOSIS — R53.83 OTHER FATIGUE: ICD-10-CM

## 2024-01-24 PROCEDURE — G0399 HOME SLEEP TEST/TYPE 3 PORTA: HCPCS | Mod: 52 | Performed by: INTERNAL MEDICINE

## 2024-01-24 NOTE — PROGRESS NOTES
Pt is completing a home sleep test. Pt was instructed on how to put on the Noxturnal T3 device and associated equipment before going to bed and given the opportunity to practice putting it on before leaving the sleep center. Pt was reminded to bring the home sleep test kit back to the center tomorrow, at agreed upon time for download and reporting.   Neck circumference: 43.18 CM / 17 inches.

## 2024-01-25 ENCOUNTER — DOCUMENTATION ONLY (OUTPATIENT)
Dept: SLEEP MEDICINE | Facility: CLINIC | Age: 34
End: 2024-01-25
Payer: COMMERCIAL

## 2024-01-25 NOTE — PROCEDURES
"HOME SLEEP STUDY INTERPRETATION    Patient: Efren Rosas  MRN: [id number]  YOB: 1990  Study Date: 1/24/2024  Referring Provider: Hola Marquez MD  Ordering Provider: Evan HEALY CNP     Indications for Home Study: Efren Rosas is a 33 year old male who presents with symptoms suggestive of obstructive sleep apnea.    Estimated body mass index is 33.42 kg/m  as calculated from the following:    Height as of 12/11/23: 1.816 m (5' 11.5\").    Weight as of 12/11/23: 110.2 kg (243 lb).  Total score - [place]: 10 (1/18/2024  9:06 AM)  Total Score: 4 (1/18/2024  9:07 AM)    Data: A full night home sleep study was performed recording the standard physiologic parameters including body position, movement, sound, nasal pressure, thermal oral airflow, chest and abdominal movements with respiratory inductance plethysmography, and oxygen saturation by pulse oximetry. Pulse rate was estimated by oximetry recording. This study was considered adequate based on > 4 hours of quality oximetry and respiratory recording. As specified by the AASM Manual for the Scoring of Sleep and Associated events, version 2.3, Rule VIII.D 1B, 4% oxygen desaturation scoring for hypopneas is used as a standard of care on all home sleep apnea testing.    Analysis Time:  342.4 minutes    Respiration:   Sleep Associated Hypoxemia: sustained hypoxemia was present. Baseline oxygen saturation was 97%.  Time with saturation less than or equal to 88% was 14.6 minutes. The lowest oxygen saturation was 80%.   Snoring: Snoring was present.  Respiratory events: The home study revealed a presence of 84 obstructive apneas and 2 mixed and central apneas. There were 41 hypopneas resulting in a combined apnea/hypopnea index [AHI] of 22.3 events per hour.  AHI was 64.3 per hour supine, N/A per hour prone, 4.5 per hour on left side, and 8.3 per hour on right side.   Pattern: Excluding events noted above, respiratory rate and pattern " was Normal.    Position: Percent of time spent: supine - 28.1%, prone - 0%, on left - 46.6%, on right - 25.4%.    Heart Rate: By pulse oximetry normal rate was noted.     Assessment:   Moderate obstructive sleep apnea.  Sleep associated hypoxemia was present.    Recommendations:  Consider auto-CPAP at 5-20 cmH2O, oral appliance therapy, positional therapy, or polysomnography with full night PAP titration.  Suggest optimizing sleep hygiene and avoiding sleep deprivation.  Weight management.    Diagnosis Code(s): Obstructive Sleep Apnea G47.33, Hypoxemia G47.36    Rick Blum DO, January 25, 2024   Diplomate, American Board of Internal Medicine, Sleep Medicine

## 2024-01-25 NOTE — PROGRESS NOTES
HST POST-STUDY QUESTIONNAIRE    What time did you go to bed?  10pm  How long do you think it took to fall asleep?  30m  What time did you wake up to start the day?  6am  Did you get up during the night at all?  n  If you woke up, do you remember approximately what time(s)? Few Times   Did you have any difficulty with the equipment?  No  Did you us any type of treatment with this study?  None  Was the head of the bed elevated? No  Did you sleep in a recliner?  No  Did you stop using CPAP at least 3 days before this test?  NA  Any other information you'd like us to know? NA

## 2024-01-25 NOTE — PROGRESS NOTES
This HSAT was performed using a Noxturnal T3 device which recorded snore, sound, movement activity, body position, nasal pressure, oronasal thermal airflow, pulse, oximetry and both chest and abdominal respiratory effort. HSAT data was restricted to the time patient states they were in bed.     HSAT was scored using 1B 4% hypopnea rule.     AHI: 22.3.  Snoring was reported as loud.  Time with SpO2 below 89% was 20.6 minutes.   Overall signal quality was fair     Pt will follow up with sleep provider to determine appropriate therapy.     Ordering Provider, Evan Franklin APRN CNP C. Oyugi, BA, [facility], RST System Clinical Specialist/ 1/25/2024

## 2024-02-13 ENCOUNTER — TELEPHONE (OUTPATIENT)
Dept: SLEEP MEDICINE | Facility: CLINIC | Age: 34
End: 2024-02-13
Payer: COMMERCIAL

## 2024-02-28 ASSESSMENT — SLEEP AND FATIGUE QUESTIONNAIRES
HOW LIKELY ARE YOU TO NOD OFF OR FALL ASLEEP WHILE LYING DOWN TO REST IN THE AFTERNOON WHEN CIRCUMSTANCES PERMIT: MODERATE CHANCE OF DOZING
HOW LIKELY ARE YOU TO NOD OFF OR FALL ASLEEP WHILE SITTING AND READING: SLIGHT CHANCE OF DOZING
HOW LIKELY ARE YOU TO NOD OFF OR FALL ASLEEP WHILE SITTING INACTIVE IN A PUBLIC PLACE: SLIGHT CHANCE OF DOZING
HOW LIKELY ARE YOU TO NOD OFF OR FALL ASLEEP IN A CAR, WHILE STOPPED FOR A FEW MINUTES IN TRAFFIC: WOULD NEVER DOZE
HOW LIKELY ARE YOU TO NOD OFF OR FALL ASLEEP WHILE SITTING QUIETLY AFTER LUNCH WITHOUT ALCOHOL: WOULD NEVER DOZE
HOW LIKELY ARE YOU TO NOD OFF OR FALL ASLEEP WHILE WATCHING TV: MODERATE CHANCE OF DOZING
HOW LIKELY ARE YOU TO NOD OFF OR FALL ASLEEP WHEN YOU ARE A PASSENGER IN A CAR FOR AN HOUR WITHOUT A BREAK: SLIGHT CHANCE OF DOZING
HOW LIKELY ARE YOU TO NOD OFF OR FALL ASLEEP WHILE SITTING AND TALKING TO SOMEONE: SLIGHT CHANCE OF DOZING

## 2024-03-06 ENCOUNTER — VIRTUAL VISIT (OUTPATIENT)
Dept: SLEEP MEDICINE | Facility: CLINIC | Age: 34
End: 2024-03-06
Payer: COMMERCIAL

## 2024-03-06 VITALS
HEIGHT: 72 IN | DIASTOLIC BLOOD PRESSURE: 80 MMHG | SYSTOLIC BLOOD PRESSURE: 138 MMHG | WEIGHT: 230 LBS | BODY MASS INDEX: 31.15 KG/M2

## 2024-03-06 DIAGNOSIS — G47.33 OSA (OBSTRUCTIVE SLEEP APNEA): Primary | ICD-10-CM

## 2024-03-06 DIAGNOSIS — E66.811 OBESITY (BMI 30.0-34.9): ICD-10-CM

## 2024-03-06 DIAGNOSIS — G47.36 HYPOXEMIA ASSOCIATED WITH SLEEP: ICD-10-CM

## 2024-03-06 PROCEDURE — 99203 OFFICE O/P NEW LOW 30 MIN: CPT | Mod: 95 | Performed by: INTERNAL MEDICINE

## 2024-03-06 ASSESSMENT — PAIN SCALES - GENERAL: PAINLEVEL: MILD PAIN (2)

## 2024-03-06 NOTE — NURSING NOTE
Has patient had flu shot for current/most recent flu season? If so, when? No      Is the patient currently in the state of [place]? YES    Visit mode:VIDEO    If the visit is dropped, the patient can be reconnected by: VIDEO VISIT: Text to cell phone:   Telephone Information:   Mobile [phone]       Will anyone else be joining the visit? NO  (If patient encounters technical issues they should call [phone]    How would you like to obtain your AVS? MyChart    Are changes needed to the allergy or medication list? No    Reason for visit: RECHECK    Anna LACEY

## 2024-03-06 NOTE — PROGRESS NOTES
Virtual Visit Details    Type of service:  Video Visit     Originating Location (pt. Location): Home    Distant Location (provider location):  Off-site  Platform used for Video Visit: [facility]    Date: March 6, 2024     Chief complaint: Review results of the home sleep study     Efren Rosas is a 33 year old male who previously presented to sleep clinic with symptoms suggestive of obstructive sleep apnea.  Home sleep study was obtained on  1/24/24  to evaluate for ROMAN.  Patient presents to sleep clinic today to review the test results and to discuss plan of care.     Home sleep study report:  Study date: 1/24/24  Analysis Time:  342.4 minutes     Respiration:   Sleep Associated Hypoxemia: sustained hypoxemia was present. Baseline oxygen saturation was 97%.  Time with saturation less than or equal to 88% was 14.6 minutes. The lowest oxygen saturation was 80%.   Snoring: Snoring was present.  Respiratory events: The home study revealed a presence of 84 obstructive apneas and 2 mixed and central apneas. There were 41 hypopneas resulting in a combined apnea/hypopnea index [AHI] of 22.3 events per hour.  AHI was 64.3 per hour supine, N/A per hour prone, 4.5 per hour on left side, and 8.3 per hour on right side.   Pattern: Excluding events noted above, respiratory rate and pattern was Normal.     Position: Percent of time spent: supine - 28.1%, prone - 0%, on left - 46.6%, on right - 25.4%.     Heart Rate: By pulse oximetry normal rate was noted.      Assessment:   Moderate obstructive sleep apnea.  Sleep associated hypoxemia was present.    Test results were discussed with the patient in detail.        Past medical/surgical history, family history, social history, medications and allergies were reviewed.       Problem list:  Patient Active Problem List   Diagnosis    CARDIOVASCULAR SCREENING; LDL GOAL LESS THAN 160    Asthma without status asthmaticus    Tobacco use disorder    Smoker     Refractive error    Pain in joint involving pelvic region and thigh    Marijuana abuse in remission    Low back pain    Eustachian tube dysfunction    Allergic rhinitis    Alcohol abuse, in remission    Adjustment disorder with anxiety               Physical Examination:   General: Pleasant. Cooperative. In no apparent distress.  Pulmonary: Able to speak in full sentences easily. No cough or wheeze.   Neurologic: Alert, oriented x3.  Psychiatric: Mood euthymic. Affect congruent with full range and intensity.     ASSESSMENT/PLAN:  Obstructive sleep apnea, pronounced during supine sleep with sleep associated hypoxemia: We discussed the results of the home sleep study with the patient in detail. We discussed the consequences of untreated sleep apnea.  We also discussed the different treatment options for ROMAN. Patient was interested in CPAP treatment.  DME orders generated for auto CPAP 5 to 15 cm water.  After obtaining CPAP equipment, patient was recommended to use the device regularly during sleep and was instructed to get back to us if any concerns with the device use.  We discussed the mask exchange policy and the compliance goals.  Patient  will be followed through sleep therapy management program.  Plan is to follow up with sleep clinic via video visit in approximately 8-10 weeks after obtaining the CPAP equipment to review compliance measures.  Sleep associated hypoxemia:At the follow-up visit will consider obtaining overnight oximetry with the CPAP to check for resolution of hypoxemia that was observed during the home sleep study.     Patient mentioned that he was told he had enlarged tonsils during his previous sleep clinic visit. He wants to try the CPAP first and would like to consider upper airway surgery as the next option if  the CPAP does not work for him.     Obesity: We discussed weight management with healthy diet, and exercise.        Patient was strongly advised to avoid driving, operating any heavy  "machinery or other hazardous situations while drowsy or sleepy.  Patient was counseled on the importance of driving while alert, to pull over if drowsy, or nap before getting into the vehicle if sleepy.         The above note was dictated using voice recognition software. Although reviewed after completion, some word and grammatical error may remain . Please contact the author for any clarifications.      \" Total time spent was 30 minutes  for this appointment on this date of service which include time spent before, during and after the visit for chart review, patient care, counseling and coordination of care. Including documentation\"       Elo Soares MD  [facility]  [address] , [place] [address]       "

## 2024-03-19 ENCOUNTER — DOCUMENTATION ONLY (OUTPATIENT)
Dept: SLEEP MEDICINE | Facility: CLINIC | Age: 34
End: 2024-03-19
Payer: COMMERCIAL

## 2024-03-19 DIAGNOSIS — G47.33 OSA (OBSTRUCTIVE SLEEP APNEA): Primary | ICD-10-CM

## 2024-03-19 NOTE — PROGRESS NOTES
Patient was offered choice of vendor and chose [facility].  Patient Efren Rosas was set up at [place]  on March 19, 2024. Patient received a Resmed Airsense 11 Pressures were set at  5-15 cm H2O.   Patient s ramp is 5 cm H2O for Auto and FLEX/EPR is EPR, 2.  Patient received a Resmed Mask name: Airfit [id number]  Nasal mask size Large, heated tubing and heated humidifier.  Patient has the following compliance requirements: usage only      Ani Nava

## 2024-03-22 ENCOUNTER — DOCUMENTATION ONLY (OUTPATIENT)
Dept: SLEEP MEDICINE | Facility: CLINIC | Age: 34
End: 2024-03-22
Payer: COMMERCIAL

## 2024-03-22 NOTE — PROGRESS NOTES
3 day Sleep therapy management telephone visit    Diagnostic AHI: 22.3 HST    Confirmed with patient at time of call- N/A Patient is still interested in STM service       Message left for patient to return call    Order settings:  CPAP MIN CPAP MAX   5 cm H2O 15 cm H2O       Device settings:  CPAP MIN CPAP MAX EPR RESMED SOFT RESPONSE SETTING   5.0 cm  H20 15.0 cm  H20 TWO OFF       Compliance 0 %    Assessment: Minimal usage     Patient has the following upcoming sleep appts:      Replacement device: No  STM ordered by provider: Yes     Total time spent on accessing and  interpreting remote patient PAP therapy data  10 minutes    Total time spent counseling, coaching  and reviewing PAP therapy data with patient  1 minutes    [id number] no

## 2024-04-03 ENCOUNTER — DOCUMENTATION ONLY (OUTPATIENT)
Dept: SLEEP MEDICINE | Facility: CLINIC | Age: 34
End: 2024-04-03
Payer: COMMERCIAL

## 2024-04-03 NOTE — PROGRESS NOTES
14  DAY STM VISIT    Diagnostic AHI:  HST: 22.3        Message left for patient to return call     Assessment: Pt not meeting objective benchmarks for compliance      Action plan: waiting for patient to return call.  and pt to have 30 day STM visit.      Device type: Auto-CPAP    PAP settings:  CPAP MIN CPAP MAX 95TH % PRESSURE EPR RESMED SOFT RESPONSE SETTING   5.0 cm  H20 15.0 cm  H20 8 cm  H20  TWO OFF     Mask type:  Nasal Mask    Objective measures: 14 day rolling measures   COMPLIANCE LEAK AHI AVERAGE USE IN MINUTES   50 % 8.64 0.88 243   GOAL >70% GOAL < 24 LPM GOAL <5 GOAL >240      Patient has the following upcoming sleep appts:      Total time spent on accessing and interpreting remote patient PAP therapy data  10 minutes    Total time spent counseling, coaching  and reviewing PAP therapy data with patient  1 minute    [id number]  [id number]  no (3 day STM)

## 2024-04-04 NOTE — PROGRESS NOTES
Pt called back and LVM that he wants to get a different mask. Called pt back and LVM for him to contact [facility].

## 2024-04-04 NOTE — PROGRESS NOTES
Pt called back. He had questions regarding condensation build up in his nasal mask. Tube temp was set 67 and he keeps his room cool. Advised pt to increase tube temp.

## 2024-04-23 ENCOUNTER — DOCUMENTATION ONLY (OUTPATIENT)
Dept: SLEEP MEDICINE | Facility: CLINIC | Age: 34
End: 2024-04-23
Payer: COMMERCIAL

## 2024-04-23 NOTE — PROGRESS NOTES
30 DAY STM VISIT    Diagnostic AHI:  HST: 22.3        Message left for patient to return call.     Assessment: Pt meeting objective benchmarks.     Patient has the following upcoming sleep appts:      Device type: Auto-CPAP  PAP settings: CPAP min 5.0 cm  H20     CPAP max 15.0 cm  H20    95th% pressure 8.3 cm  H20      RESMED EPR level Setting: TWO    RESMED Soft response setting:  OFF  Mask type:  Nasal Mask  Objective measures: 14 day rolling measures      Compliance  92 %      Leak  4.8 lpm  last  upload      AHI 0.51   last  upload      Average number of minutes 335      Objective measure goal  Compliance   Goal >70%  Leak   Goal < 24 lpm  AHI  Goal < 5  Usage  Goal >240        Total time spent on accessing and interpreting remote patient PAP therapy data  10 minutes    Total time spent counseling, coaching  and reviewing PAP therapy data with patient  1 minutes     [id number] this call  [id number] no  at 3 or 14 day [facility]

## 2024-05-31 ASSESSMENT — SLEEP AND FATIGUE QUESTIONNAIRES
HOW LIKELY ARE YOU TO NOD OFF OR FALL ASLEEP WHILE SITTING AND TALKING TO SOMEONE: WOULD NEVER DOZE
HOW LIKELY ARE YOU TO NOD OFF OR FALL ASLEEP WHILE WATCHING TV: SLIGHT CHANCE OF DOZING
HOW LIKELY ARE YOU TO NOD OFF OR FALL ASLEEP WHILE SITTING INACTIVE IN A PUBLIC PLACE: WOULD NEVER DOZE
HOW LIKELY ARE YOU TO NOD OFF OR FALL ASLEEP IN A CAR, WHILE STOPPED FOR A FEW MINUTES IN TRAFFIC: WOULD NEVER DOZE
HOW LIKELY ARE YOU TO NOD OFF OR FALL ASLEEP WHILE LYING DOWN TO REST IN THE AFTERNOON WHEN CIRCUMSTANCES PERMIT: SLIGHT CHANCE OF DOZING
HOW LIKELY ARE YOU TO NOD OFF OR FALL ASLEEP WHEN YOU ARE A PASSENGER IN A CAR FOR AN HOUR WITHOUT A BREAK: SLIGHT CHANCE OF DOZING
HOW LIKELY ARE YOU TO NOD OFF OR FALL ASLEEP WHILE SITTING QUIETLY AFTER LUNCH WITHOUT ALCOHOL: WOULD NEVER DOZE
HOW LIKELY ARE YOU TO NOD OFF OR FALL ASLEEP WHILE SITTING AND READING: WOULD NEVER DOZE

## 2024-06-03 ENCOUNTER — VIRTUAL VISIT (OUTPATIENT)
Dept: SLEEP MEDICINE | Facility: CLINIC | Age: 34
End: 2024-06-03
Payer: COMMERCIAL

## 2024-06-03 VITALS — WEIGHT: 250 LBS | HEIGHT: 71 IN | BODY MASS INDEX: 35 KG/M2

## 2024-06-03 DIAGNOSIS — F17.200 TOBACCO USE DISORDER: ICD-10-CM

## 2024-06-03 DIAGNOSIS — G47.33 OSA (OBSTRUCTIVE SLEEP APNEA): Primary | ICD-10-CM

## 2024-06-03 DIAGNOSIS — J45.909 ASTHMA: Primary | ICD-10-CM

## 2024-06-03 DIAGNOSIS — J45.909 ASTHMA WITHOUT STATUS ASTHMATICUS WITHOUT COMPLICATION, UNSPECIFIED ASTHMA SEVERITY, UNSPECIFIED WHETHER PERSISTENT: ICD-10-CM

## 2024-06-03 PROCEDURE — 99213 OFFICE O/P EST LOW 20 MIN: CPT | Mod: 95 | Performed by: NURSE PRACTITIONER

## 2024-06-03 ASSESSMENT — PAIN SCALES - GENERAL: PAINLEVEL: NO PAIN (0)

## 2024-06-03 NOTE — PROGRESS NOTES
Obstructive Sleep Apnea - PAP Follow-Up Visit:    Chief Complaint   Patient presents with    RECHECK       Efren Rosas comes in today for follow-up of their moderate sleep apnea, managed with CPAP.  His initial sleep medicine consultation took place on 12/11/2023 with NIRAJ Prescott, CNP.  He had symptoms of loud snoring, witnessed apneas, daytime somnolence and was concern for possible obstructive sleep apnea.  He had comorbid diagnosis of allergic rhinitis, asthma, eustachian tube dysfunction, low back pain, adjustment disorder with anxiety, tobacco use disorder marijuana abuse/in remission, alcohol abuse/in remission.  He underwent sleep testing on 1/24/2024 revealing moderate sleep apnea and was subsequently placed on CPAP therapy.  He presents today for an evaluation of efficacy and compliance.    Do you use a CPAP Machine at home: Yes  Overall, on a scale of 0-10 how would you rate your CPAP (0 poor, 10 great): 8  Is your mask comfortable: Yes  If not, why:    Is you mask leaking: No  If yes, where do you feel it:    How many night per week does the mask leak (0-7):    Do you notice snoring with mask on: No  Do you notice gasping arousals with mask on: No  Are you having significant oral or nasal dryness: No  Is the pressure setting comfortable: No  In not, why: I think i would like to try a lower pressure setting as my ears pop at times will decrease upper pressure to 12 cm H20  What type of mask do you use: Nasal Mask  What is your typical bedtime: 9:30pm  How long does it take you to go to sleep on PAP therapy: 10 to 15 minutes  What time do you typically get out of bed for the day: During work week waking at 3:50am. Not working 6:30am  How many hours on average per night are you using PAP therapy: 7 hours  How many hours are you sleeping per night: 7 hours  Do you feel well rested in the morning: Yes    Weight change since previous sleep study: (243 pounds): +7 pounds    EPWORTH SLEEPINESS  SCALE         5/31/2024     8:51 PM    Preston Sleepiness Scale ( ALEA Borges  [id number]<br>ESS - [place]/English - Final version - 21 Nov 07 - [facility] [place].)   Sitting and reading Would never doze   Watching TV Slight chance of dozing   Sitting, inactive in a public place (e.g. a theatre or a meeting) Would never doze   As a passenger in a car for an hour without a break Slight chance of dozing   Lying down to rest in the afternoon when circumstances permit Slight chance of dozing   Sitting and talking to someone Would never doze   Sitting quietly after a lunch without alcohol Would never doze   In a car, while stopped for a few minutes in traffic Would never doze   Preston Score (MC) 3   Preston Score (Sleep) 3       INSOMNIA SEVERITY INDEX (FAWN)          5/31/2024     8:45 PM   Insomnia Severity Index (FAWN)   Difficulty falling asleep 1   Difficulty staying asleep 0   Problems waking up too early 0   How SATISFIED/DISSATISFIED are you with your CURRENT sleep pattern? 1   How NOTICEABLE to others do you think your sleep problem is in terms of impairing the quality of your life? 1   How WORRIED/DISTRESSED are you about your current sleep problem? 1   To what extent do you consider your sleep problem to INTERFERE with your daily functioning (e.g. daytime fatigue, mood, ability to function at work/daily chores, concentration, memory, mood, etc.) CURRENTLY? 1   FAWN Total Score 5       Guidelines for Scoring/Interpretation:  Total score categories:  0-7 = No clinically significant insomnia   8-14 = Subthreshold insomnia   15-21 = Clinical insomnia (moderate severity)  22-28 = Clinical insomnia (severe)  Used via courtesy of www.ACellth.va.gov with permission from Kyree Kilpatrick PhD., [facility] [place], [place]    ResMed   Auto-PAP 5.0 - 15.0 cmH2O 30 day usage data:    96% of days with > 4 hours of use. 0/30 days with no use.   Average use 377 minutes per day.   95%ile Leak 3.79 L/min.   CPAP 95% pressure 8.7 cm.   AHI  "0.54 events per hour.     Previous Sleep Testing:  Home sleep study report:  Study date: 1/24/24  Analysis Time:  342.4 minutes    Estimated body mass index is 33.42 kg/m  as calculated from the following:    Height as of 12/11/23: 1.816 m (5' 11.5\").    Weight as of 12/11/23: 110.2 kg (243 lb).  Total score - Amery: 10 (1/18/2024  9:06 AM)  Total Score: 4 (1/18/2024  9:07 AM)  Respiration:   Sleep Associated Hypoxemia: sustained hypoxemia was present. Baseline oxygen saturation was 97%.  Time with saturation less than or equal to 88% was 14.6 minutes. The lowest oxygen saturation was 80%.   Snoring: Snoring was present.  Respiratory events: The home study revealed a presence of 84 obstructive apneas and 2 mixed and central apneas. There were 41 hypopneas resulting in a combined apnea/hypopnea index [AHI] of 22.3 events per hour.  AHI was 64.3 per hour supine, N/A per hour prone, 4.5 per hour on left side, and 8.3 per hour on right side.   Pattern: Excluding events noted above, respiratory rate and pattern was Normal.     Position: Percent of time spent: supine - 28.1%, prone - 0%, on left - 46.6%, on right - 25.4%.        Reviewed by team:  Tobacco  Allergies  Meds            Reviewed by provider:  Tobacco  Allergies  Meds  Problems  Med Hx  Surg Hx  Fam Hx           Problem List:  Patient Active Problem List    Diagnosis Date Noted    Asthma without status asthmaticus 12/11/2023     Priority: Medium     Sep 08, 2010 Entered By: JULISA HENNESSY Comment: PFTS done in 2008 - normal.      Tobacco use disorder 12/11/2023     Priority: Medium    Refractive error 12/11/2023     Priority: Medium    Pain in joint involving pelvic region and thigh 12/11/2023     Priority: Medium     Sep 08, 2010 Entered By: JULISA HENNESSY Comment: bilateral hip pain      Marijuana abuse in remission 12/11/2023     Priority: Medium    Low back pain 12/11/2023     Priority: Medium    Eustachian tube dysfunction 12/11/2023     " "Priority: Medium     continue present medications      Allergic rhinitis 12/11/2023     Priority: Medium    Alcohol abuse, in remission 12/11/2023     Priority: Medium    Adjustment disorder with anxiety 12/11/2023     Priority: Medium    Smoker 10/27/2015     Priority: Medium     Formatting of this note might be different from the original. started at age 18, max 2 packs per day, quit x 1 year,  trying to quit, quit before \"cold turkey\"      CARDIOVASCULAR SCREENING; LDL GOAL LESS THAN 160 10/31/2010     Priority: Medium          Ht 1.803 m (5' 11\")   Wt 113.4 kg (250 lb)   BMI 34.87 kg/m      Impression/Plan:    ICD-10-CM    1. ROMAN (obstructive sleep apnea)  G47.33 Sleep Comprehensive DME      2. Asthma without status asthmaticus without complication, unspecified asthma severity, unspecified whether persistent  J45.909 Sleep Comprehensive DME      3. Tobacco use disorder  F17.200 Sleep Comprehensive DME        Efren admits to using his CPAP on a nightly basis and attempts to do so for the entire duration of his sleep.  He appreciates the benefits that CPAP brings to him, and denies any symptoms commonly associated with obstructive sleep apnea.  He denies afternoon sleepiness as well as intrusion of sleepiness into his driving capabilities.  Will plan on decreasing lower CPAP pressure to 12 cm H2O, per his request.  [facility] if this is not tolerable.  A comprehensive order for needed supplies and equipment was placed today for the coming year.  Recommend that he optimize his sleep schedule as well as his sleep hygiene practices to mitigate any further sleep intrusion of sleepiness.  Recommend that he employ safe driving practices including not driving a motor vehicle should he become drowsy.  Recommend weight management to BMI of 30.0    Efren Rosas will follow up in about 1 year, sooner if needed    20 minutes spent with patient, all of which were spent face-to-face " counseling, consulting, coordinating plan of care.      NIRAJ Shepherd CNP  Sleep Medicine    This note was written with the assistance of the Dragon voice-dictation technology software. The final document, although reviewed, may contain errors. For corrections, please contact the office.      CC:  Hola Marquez,

## 2024-06-03 NOTE — NURSING NOTE
Is the patient currently in the state of [place]? YES    Visit mode:VIDEO    If the visit is dropped, the patient can be reconnected by: VIDEO VISIT: Text to cell phone:   Telephone Information:   Mobile [phone]       Will anyone else be joining the visit? NO  (If patient encounters technical issues they should call [phone]    How would you like to obtain your AVS? MyChart    Are changes needed to the allergy or medication list? No    Are refills needed on medications prescribed by this physician? NO    Reason for visit: RECHECK    [place]

## 2024-06-03 NOTE — PROGRESS NOTES
Virtual Visit Details    Type of service:  Video Visit 10:20 AM- 10:57 AM     Originating Location (pt. Location): Home    Distant Location (provider location): Onsite  Platform used for Video Visit: Well

## 2024-06-03 NOTE — PATIENT INSTRUCTIONS
Drive Safe... Drive Alive     Sleep health profoundly affects your health, mood, and your safety. 33% of the population (one in three of us) is not getting enough sleep and many have a sleep disorder. Not getting enough sleep or having an untreated / undertreated sleep condition may make us sleepy without even knowing it. In fact, our driving could be dramatically impaired due to our sleep health. As your provider, here are some things I would like you to know about driving:     Here are some warning signs for impairment and dangerous drowsy driving:              -Having been awake more than 16 hours               -Looking tired               -Eyelid drooping              -Head nodding (it could be too late at this point)              -Driving for more than 30 minutes     Some things you could do to make the driving safer if you are experiencing some drowsiness:              -Stop driving and rest              -Call for transportation              -Make sure your sleep disorder is adequately treated     Some things that have been shown NOT to work when experiencing drowsiness while driving:              -Turning on the radio              -Opening windows              -Eating any  distracting  /  entertaining  foods (e.g., sunflower seeds, candy, or any other)              -Talking on the phone      Your decision may not only impact your life, but also the life of others. Please, remember to drive safe for yourself and all of us.   Your Body mass index is 34.87 kg/m .  Weight management is a personal decision.  If you are interested in exploring weight loss strategies, the following discussion covers the approaches that may be successful. Body mass index (BMI) is one way to tell whether you are at a healthy weight, overweight, or obese. It measures your weight in relation to your height.  A BMI of 18.5 to 24.9 is in the healthy range. A person with a BMI of 25 to 29.9 is considered overweight, and someone with a BMI  of 30 or greater is considered obese. More than two-thirds of American adults are considered overweight or obese.  Being overweight or obese increases the risk for further weight gain. Excess weight may lead to heart disease and diabetes.  Creating and following plans for healthy eating and physical activity may help you improve your health.  Weight control is part of healthy lifestyle and includes exercise, emotional health, and healthy eating habits. Careful eating habits lifelong are the mainstay of weight control. Though there are significant health benefits from weight loss, long-term weight loss with diet alone may be very difficult to achieve- studies show long-term success with dietary management in less than 10% of people. Attaining a healthy weight may be especially difficult to achieve in those with severe obesity. In some cases, medications, devices and surgical management might be considered.  What can you do?  If you are overweight or obese and are interested in methods for weight loss, you should discuss this with your provider.   Consider reducing daily calorie intake by 500 calories.   Keep a food journal.   Avoiding skipping meals, consider cutting portions instead.    Diet combined with exercise helps maintain muscle while optimizing fat loss. Strength training is particularly important for building and maintaining muscle mass. Exercise helps reduce stress, increase energy, and improves fitness. Increasing exercise without diet control, however, may not burn enough calories to loose weight.     Start walking three days a week 10-20 minutes at a time  Work towards walking thirty minutes five days a week   Eventually, increase the speed of your walking for 1-2 minutes at time    In addition, we recommend that you review healthy lifestyles and methods for weight loss available through the National Institutes of Health patient information sites:  http://win.niddk.nih.gov/publications/index.htm    And  look into health and wellness programs that may be available through your health insurance provider, employer, local community center, or [employer] club.            Your Body mass index is 34.87 kg/m .  Weight management is a personal decision.  If you are interested in exploring weight loss strategies, the following discussion covers the approaches that may be successful. Body mass index (BMI) is one way to tell whether you are at a healthy weight, overweight, or obese. It measures your weight in relation to your height.  A BMI of 18.5 to 24.9 is in the healthy range. A person with a BMI of 25 to 29.9 is considered overweight, and someone with a BMI of 30 or greater is considered obese. More than two-thirds of American adults are considered overweight or obese.  Being overweight or obese increases the risk for further weight gain. Excess weight may lead to heart disease and diabetes.  Creating and following plans for healthy eating and physical activity may help you improve your health.  Weight control is part of healthy lifestyle and includes exercise, emotional health, and healthy eating habits. Careful eating habits lifelong are the mainstay of weight control. Though there are significant health benefits from weight loss, long-term weight loss with diet alone may be very difficult to achieve- studies show long-term success with dietary management in less than 10% of people. Attaining a healthy weight may be especially difficult to achieve in those with severe obesity. In some cases, medications, devices and surgical management might be considered.  What can you do?  If you are overweight or obese and are interested in methods for weight loss, you should discuss this with your provider.   Consider reducing daily calorie intake by 500 calories.   Keep a food journal.   Avoiding skipping meals, consider cutting portions instead.    Diet combined with exercise helps maintain muscle while optimizing fat loss. Strength  training is particularly important for building and maintaining muscle mass. Exercise helps reduce stress, increase energy, and improves fitness. Increasing exercise without diet control, however, may not burn enough calories to loose weight.     Start walking three days a week 10-20 minutes at a time  Work towards walking thirty minutes five days a week   Eventually, increase the speed of your walking for 1-2 minutes at time    In addition, we recommend that you review healthy lifestyles and methods for weight loss available through the National Institutes of Health patient information sites:  http://win.niddk.nih.gov/publications/index.htm    And look into health and wellness programs that may be available through your health insurance provider, employer, local community center, or [employer] club.

## 2024-07-19 ASSESSMENT — ASTHMA QUESTIONNAIRES
ACT_TOTALSCORE: 17
QUESTION_5 LAST FOUR WEEKS HOW WOULD YOU RATE YOUR ASTHMA CONTROL: SOMEWHAT CONTROLLED
ACT_TOTALSCORE: 17
QUESTION_4 LAST FOUR WEEKS HOW OFTEN HAVE YOU USED YOUR RESCUE INHALER OR NEBULIZER MEDICATION (SUCH AS ALBUTEROL): ONCE A WEEK OR LESS
QUESTION_2 LAST FOUR WEEKS HOW OFTEN HAVE YOU HAD SHORTNESS OF BREATH: ONCE A DAY
QUESTION_1 LAST FOUR WEEKS HOW MUCH OF THE TIME DID YOUR ASTHMA KEEP YOU FROM GETTING AS MUCH DONE AT WORK, SCHOOL OR AT HOME: A LITTLE OF THE TIME
QUESTION_3 LAST FOUR WEEKS HOW OFTEN DID YOUR ASTHMA SYMPTOMS (WHEEZING, COUGHING, SHORTNESS OF BREATH, CHEST TIGHTNESS OR PAIN) WAKE YOU UP AT NIGHT OR EARLIER THAN USUAL IN THE MORNING: ONCE OR TWICE

## 2024-07-24 ENCOUNTER — OFFICE VISIT (OUTPATIENT)
Dept: PULMONOLOGY | Facility: CLINIC | Age: 34
End: 2024-07-24
Attending: NURSE PRACTITIONER
Payer: COMMERCIAL

## 2024-07-24 ENCOUNTER — TELEPHONE (OUTPATIENT)
Dept: PULMONOLOGY | Facility: CLINIC | Age: 34
End: 2024-07-24

## 2024-07-24 VITALS
BODY MASS INDEX: 33.46 KG/M2 | OXYGEN SATURATION: 97 % | HEIGHT: 72 IN | DIASTOLIC BLOOD PRESSURE: 84 MMHG | HEART RATE: 64 BPM | WEIGHT: 247 LBS | SYSTOLIC BLOOD PRESSURE: 132 MMHG

## 2024-07-24 DIAGNOSIS — J45.909 ASTHMA: ICD-10-CM

## 2024-07-24 DIAGNOSIS — D72.19 OTHER EOSINOPHILIA: ICD-10-CM

## 2024-07-24 DIAGNOSIS — Z91.09 ENVIRONMENTAL ALLERGIES: ICD-10-CM

## 2024-07-24 DIAGNOSIS — J45.50 SEVERE PERSISTENT ASTHMA WITHOUT COMPLICATION (H): Primary | ICD-10-CM

## 2024-07-24 DIAGNOSIS — J45.50 SEVERE PERSISTENT ASTHMA WITHOUT COMPLICATION (H): ICD-10-CM

## 2024-07-24 LAB
DLCOCOR-%PRED-PRE: 92 %
DLCOCOR-PRE: 31.03 ML/MIN/MMHG
DLCOUNC-%PRED-PRE: 87 %
DLCOUNC-PRE: 29.43 ML/MIN/MMHG
DLCOUNC-PRED: 33.58 ML/MIN/MMHG
ERV-%PRED-PRE: 34 %
ERV-PRE: 0.64 L
ERV-PRED: 1.87 L
EXPTIME-PRE: 6.33 SEC
FEF2575-%PRED-PRE: 83 %
FEF2575-PRE: 3.55 L/SEC
FEF2575-PRED: 4.28 L/SEC
FEFMAX-%PRED-PRE: 70 %
FEFMAX-PRE: 7.42 L/SEC
FEFMAX-PRED: 10.59 L/SEC
FEV1-%PRED-PRE: 82 %
FEV1-PRE: 3.52 L
FEV1FEV6-PRE: 81 %
FEV1FEV6-PRED: 83 %
FEV1FVC-PRE: 81 %
FEV1FVC-PRED: 82 %
FEV1SVC-PRE: 84 %
FEV1SVC-PRED: 80 %
FIFMAX-PRE: 6.49 L/SEC
FRCPLETH-%PRED-PRE: 78 %
FRCPLETH-PRE: 2.73 L
FRCPLETH-PRED: 3.47 L
FVC-%PRED-PRE: 83 %
FVC-PRE: 4.33 L
FVC-PRED: 5.19 L
HGB BLD-MCNC: 12.9 G/DL
IC-%PRED-PRE: 95 %
IC-PRE: 3.55 L
IC-PRED: 3.73 L
RVPLETH-%PRED-PRE: 109 %
RVPLETH-PRE: 2.09 L
RVPLETH-PRED: 1.91 L
TLCPLETH-%PRED-PRE: 84 %
TLCPLETH-PRE: 6.28 L
TLCPLETH-PRED: 7.43 L
VA-%PRED-PRE: 78 %
VA-PRE: 5.52 L
VC-%PRED-PRE: 78 %
VC-PRE: 4.19 L
VC-PRED: 5.35 L

## 2024-07-24 PROCEDURE — 94375 RESPIRATORY FLOW VOLUME LOOP: CPT | Mod: 26 | Performed by: INTERNAL MEDICINE

## 2024-07-24 PROCEDURE — 95012 NITRIC OXIDE EXP GAS DETER: CPT | Performed by: NURSE PRACTITIONER

## 2024-07-24 PROCEDURE — 85018 HEMOGLOBIN: CPT | Mod: QW

## 2024-07-24 PROCEDURE — 94726 PLETHYSMOGRAPHY LUNG VOLUMES: CPT | Mod: 26 | Performed by: INTERNAL MEDICINE

## 2024-07-24 PROCEDURE — 99204 OFFICE O/P NEW MOD 45 MIN: CPT | Performed by: NURSE PRACTITIONER

## 2024-07-24 PROCEDURE — 99207 PR NO BILLABLE SERVICE THIS VISIT: CPT | Performed by: INTERNAL MEDICINE

## 2024-07-24 PROCEDURE — 94729 DIFFUSING CAPACITY: CPT | Mod: 26 | Performed by: INTERNAL MEDICINE

## 2024-07-24 RX ORDER — ALBUTEROL SULFATE 0.83 MG/ML
2.5 SOLUTION RESPIRATORY (INHALATION) EVERY 6 HOURS PRN
Qty: 180 ML | Refills: 11 | Status: CANCELLED | OUTPATIENT
Start: 2024-07-24

## 2024-07-24 RX ORDER — ALBUTEROL SULFATE 90 UG/1
2 AEROSOL, METERED RESPIRATORY (INHALATION) EVERY 6 HOURS PRN
Qty: 18 G | Refills: 11 | Status: SHIPPED | OUTPATIENT
Start: 2024-07-24

## 2024-07-24 RX ORDER — ALBUTEROL SULFATE 90 UG/1
2 AEROSOL, METERED RESPIRATORY (INHALATION) EVERY 6 HOURS PRN
Qty: 18 G | Refills: 11 | Status: SHIPPED | OUTPATIENT
Start: 2024-07-24 | End: 2024-07-24

## 2024-07-24 RX ORDER — IPRATROPIUM BROMIDE AND ALBUTEROL SULFATE 2.5; .5 MG/3ML; MG/3ML
1 SOLUTION RESPIRATORY (INHALATION) EVERY 6 HOURS PRN
Qty: 180 ML | Refills: 11 | Status: SHIPPED | OUTPATIENT
Start: 2024-07-24

## 2024-07-24 NOTE — TELEPHONE ENCOUNTER
Efren called to have his [facility] but they need a 3 month supply. He also needs the albuterol sent to the [facility] in [place] instead of [facility]. Will send those refill in today.

## 2024-07-24 NOTE — PROGRESS NOTES
Pulmonary Clinic Consultation          Assessment/Plan:     33 year old male with a history of asthma, allergic rhinitis, ROMAN on cpap, GERD - presenting to establish care in [facility] pulmonary clinic.    Severe persistent asthma  Allergic rhinitis  Eosinophilia  Environmental allergies  Former smoker x15 years, quit 2022.  Asthma diagnosis in childhood, hx of environmental allergies and eosinophilia in past.  Absolute eosinophils 0.5 in 2016, and 0.6 in 2018 and 2019.  He was previously followed by Dr Ellis at [facility], now switching to our clinic.  He has used Qvar, Advair, zafirlukast, montelukast in past.  Montelukast caused worsening GERD symptoms.  He now feels that his asthma is out of control, with increased shortness of breath.  He is unsure why he stopped zafirlukast, but found benefit from this.  He has taken prilosec in past with benefit.  Recent diagnosis of ROMAN, now on cpap.  Pulmonary function testing shows normal spirometry, lung volumes, and diffusion capacity.  No recent lung imaging on file.  FeNO in clinic today was 19.    Plan:  - reviewed PFT results with patient today.  - increase regimen from Advair to Trelegy Ellipta (fluticasone/umeclidinium/vilanterol) 200/62.5/25, one inhalation daily, rinse/gargle after use.   - continue Albuterol inhaler or Duonebs PRN.  Supplied him with nebulizer machine today.  - have blood work done:  cbc w/diff and IgE, may be candidate for Biologics and/or zafirlukast again.  - continue cpap  - may need better management of GERD, as this could be exacerbating asthma control as well.  - discussed peak flow meter, he has multiple at home, asked him to use this to develop baseline number and so we can monitor.  - Action plan: prednisone 40mg x5 days.  I gave him action plan paperwork.    Follow-up  - 6 weeks      Sirena Pratt, CNP  Pulmonary Medicine  [facility]  [phone]       CC:     Asthma      HPI:     33 year old male  with a history of asthma, allergic rhinitis, ROMAN on cpap, GERD - presenting to establish care in new pulmonary clinic.    Asthma as young child, grew out of this for awhile, then 'came back'.    Frequent illness:  chest tightness with URIs, was going in every couple months for steroids, somewhat better recently.   Triggers:  humid weather, activity  Allergies:  allergy testing blood work, pollen.    Allergy symptoms:   stuffy nose  Medications for allergies:  none now, was on singulair, didn't help much and caused reflu, stopped.  Takes OTC antihistamine PRN.   Smoking hx:  smoked x15 years, 1 PPD.  quit 2/2022.  when quit, for few months felt great, then started feeling worse.   Inhalers tried:  advair x1 year, was helping for a little while, doesn't feel like it's doing much.  Was on qvar, switched because of cost.   Symptoms:  SOB, can't keep up with son.   Labs done in past: eosinophilia  Spirometry/methacholine challenge done in past: normal  Hx of nasal polyps, atopic dermatitis, ROMAN, GERD:  singulair gave him bad heart burn, has GERD symptoms few times/week, chest tightness/burning.  Was on prilosec x14 days, helped, no longer.  ROMAN, cpap machine 5 months ago.  Doesn't help with mucous in morning, coughs a lot in morning.       Medical records reviewed for this visit include PCP, previous pulmonologist.        11/29/2023     9:19 AM 7/19/2024     9:19 PM   ACT Total Scores   ACT TOTAL SCORE (Goal Greater than or Equal to 20) 21 17   In the past 12 months, how many times did you visit the emergency room for your asthma without being admitted to the hospital? 0 0   In the past 12 months, how many times were you hospitalized overnight because of your asthma? 0 0          ROS:     A 12-system review was obtained and was negative with the exception of the symptoms endorsed in the HPI.       Medical history:       PMH:  Past Medical History:   Diagnosis Date    Allergic rhinitis, cause unspecified     Attention  deficit disorder without mention of hyperactivity        PSH:  Past Surgical History:   Procedure Laterality Date    NO HISTORY OF SURGERY         Allergies:  No Known Allergies    Family Hx:  Family History   Problem Relation Age of Onset    Family History Negative No family hx of     Diabetes No family hx of     Cerebrovascular Disease No family hx of     Hypertension No family hx of     Breast Cancer No family hx of     Cancer - colorectal No family hx of     Prostate Cancer No family hx of     C.A.D. No family hx of     Lipids Father     Cancer Paternal Grandmother        Social Hx:  Social History     Socioeconomic History    Marital status:      Spouse name: Not on file    Number of children: Not on file    Years of education: Not on file    Highest education level: Not on file   Occupational History    Not on file   Tobacco Use    Smoking status: Former     Current packs/day: 0.00     Average packs/day: 0.3 packs/day for 4.0 years (1.0 ttl pk-yrs)     Types: Cigarettes     Start date: 2/15/2017     Quit date: 2/15/2021     Years since quitting: 3.4     Passive exposure: Never    Smokeless tobacco: Never   Vaping Use    Vaping status: Never Used   Substance and Sexual Activity    Alcohol use: No    Drug use: No    Sexual activity: Not on file   Other Topics Concern    Parent/sibling w/ CABG, MI or angioplasty before 65F 55M? Not Asked   Social History Narrative    Not on file     Social Determinants of Health     Financial Resource Strain: Low Risk  (11/29/2023)    Financial Resource Strain     Within the past 12 months, have you or your family members you live with been unable to get utilities (heat, electricity) when it was really needed?: No   Food Insecurity: High Risk (11/29/2023)    Food Insecurity     Within the past 12 months, did you worry that your food would run out before you got money to buy more?: Yes     Within the past 12 months, did the food you bought just not last and you didn t have  "money to get more?: Yes   Transportation Needs: Low Risk  (11/29/2023)    Transportation Needs     Within the past 12 months, has lack of transportation kept you from medical appointments, getting your medicines, non-medical meetings or appointments, work, or from getting things that you need?: No   Physical Activity: Not on file   Stress: Not on file   Social Connections: Unknown (1/1/2022)    [facility]    Social Connections     Frequency of Communication with Friends and Family: Not on file   Interpersonal Safety: Low Risk  (11/29/2023)    Interpersonal Safety     Do you feel physically and emotionally safe where you currently live?: Yes     Within the past 12 months, have you been hit, slapped, kicked or otherwise physically hurt by someone?: No     Within the past 12 months, have you been humiliated or emotionally abused in other ways by your partner or ex-partner?: No   Housing Stability: Low Risk  (11/29/2023)    Housing Stability     Do you have housing? : Yes     Are you worried about losing your housing?: No       Current Meds:  Current Outpatient Medications   Medication Sig Dispense Refill    fluticasone-salmeterol (ADVAIR DISKUS) 250-50 MCG/ACT inhaler USE 1 INHALATION EVERY 12 HOURS 3 each 3    IBUPROFEN 200 200 MG OR TABS Take 2 tablets by mouth as needed            Physical Exam:     /84   Pulse 64   Ht 1.816 m (5' 11.5\")   Wt 112 kg (247 lb)   SpO2 97%   BMI 33.97 kg/m    Gen: adult male, appears in NAD  HEENT: clear conjunctivae, moist mucous membranes  CV: RRR, no M/G/R  Resp: CTAB, no focal crackles or wheezes.  Respirations even and unlabored.  On RA.  No cough.  Skin: no apparent rashes on visible skin  Ext: no cyanosis, clubbing or edema  Neuro: alert and answering questions appropriately       Data:     Labs:  reviewed    Imaging studies:  I have personally reviewed all pertinent imaging " studies and PFT results unless otherwise noted.        Pulmonary Function Testing    7/24/24:      Spirometry Results 6/14/2019 ([facility])  FVC (Pre) 4.69   FVC % Predicted (Pre) 83   FEV1 (Pre) 3.24   FEV1 % Predicted (Pre) 70   FEV1/FVC % (Pre) 69.08   Valid test (Pre) Yes   FVC (Post) 4.73   FVC % Predicted (Post) 84   FEV1 (Post) 3.6   FEV1 % Predicted (Post) 78   FEV1/FVC % (Post) 76.11   POST-bronchodilator FEV1% improvement 11   Valid test (Post) Yes

## 2024-07-24 NOTE — PATIENT INSTRUCTIONS
It was a pleasure to see you in clinic today.   Here is what we discussed:    Stop Advair, start Trelegy Ellipta one puff daily, rinse/gargle after use.  Continue Albuterol inhaler, or duonebs every 4-6 hours as needed for shortness of breath or wheezing.  Have your blood work done.  Start using peak flow meter.  Call my nurse, Colt ([phone]) with any change or worsening of your breathing.  We can then send out your 'action plan' medications (prednisone).  Follow-up in 6 weeks.    Sirena Pratt, CNP  Pulmonary Medicine  [facility]  [phone]

## 2024-07-24 NOTE — PROGRESS NOTES
Patient instructed in use of nebulizer machine from [facility].  Patient states good understanding of how to use the nebulizer machine.  Nebulizer machine given to patient from [facility].  All paperwork signed and copy scanned to chart. Phone numbers given to patient to call if any questions.

## 2024-07-25 ENCOUNTER — LAB (OUTPATIENT)
Dept: LAB | Facility: CLINIC | Age: 34
End: 2024-07-25
Payer: COMMERCIAL

## 2024-07-25 DIAGNOSIS — J45.50 SEVERE PERSISTENT ASTHMA WITHOUT COMPLICATION (H): ICD-10-CM

## 2024-07-25 DIAGNOSIS — Z91.09 ENVIRONMENTAL ALLERGIES: ICD-10-CM

## 2024-07-25 DIAGNOSIS — D72.19 OTHER EOSINOPHILIA: ICD-10-CM

## 2024-07-25 LAB
BASOPHILS # BLD AUTO: 0 10E3/UL (ref 0–0.2)
BASOPHILS NFR BLD AUTO: 0 %
EOSINOPHIL # BLD AUTO: 0.3 10E3/UL (ref 0–0.7)
EOSINOPHIL NFR BLD AUTO: 4 %
ERYTHROCYTE [DISTWIDTH] IN BLOOD BY AUTOMATED COUNT: 13 % (ref 10–15)
HCT VFR BLD AUTO: 42.6 % (ref 40–53)
HGB BLD-MCNC: 13.7 G/DL (ref 13.3–17.7)
IMM GRANULOCYTES # BLD: 0 10E3/UL
IMM GRANULOCYTES NFR BLD: 0 %
LYMPHOCYTES # BLD AUTO: 3 10E3/UL (ref 0.8–5.3)
LYMPHOCYTES NFR BLD AUTO: 31 %
MCH RBC QN AUTO: 29.1 PG (ref 26.5–33)
MCHC RBC AUTO-ENTMCNC: 32.2 G/DL (ref 31.5–36.5)
MCV RBC AUTO: 91 FL (ref 78–100)
MONOCYTES # BLD AUTO: 0.5 10E3/UL (ref 0–1.3)
MONOCYTES NFR BLD AUTO: 5 %
NEUTROPHILS # BLD AUTO: 5.8 10E3/UL (ref 1.6–8.3)
NEUTROPHILS NFR BLD AUTO: 60 %
PLATELET # BLD AUTO: 211 10E3/UL (ref 150–450)
RBC # BLD AUTO: 4.7 10E6/UL (ref 4.4–5.9)
WBC # BLD AUTO: 9.7 10E3/UL (ref 4–11)

## 2024-07-25 PROCEDURE — 85025 COMPLETE CBC W/AUTO DIFF WBC: CPT

## 2024-07-25 PROCEDURE — 36415 COLL VENOUS BLD VENIPUNCTURE: CPT

## 2024-07-25 PROCEDURE — 82785 ASSAY OF IGE: CPT

## 2024-07-26 LAB — IGE SERPL-ACNC: 15 KU/L (ref 0–114)

## 2024-08-07 ENCOUNTER — PATIENT OUTREACH (OUTPATIENT)
Dept: CARE COORDINATION | Facility: CLINIC | Age: 34
End: 2024-08-07
Payer: COMMERCIAL

## 2024-08-21 ENCOUNTER — PATIENT OUTREACH (OUTPATIENT)
Dept: CARE COORDINATION | Facility: CLINIC | Age: 34
End: 2024-08-21
Payer: COMMERCIAL

## 2024-08-31 ASSESSMENT — ASTHMA QUESTIONNAIRES
QUESTION_2 LAST FOUR WEEKS HOW OFTEN HAVE YOU HAD SHORTNESS OF BREATH: ONCE OR TWICE A WEEK
QUESTION_4 LAST FOUR WEEKS HOW OFTEN HAVE YOU USED YOUR RESCUE INHALER OR NEBULIZER MEDICATION (SUCH AS ALBUTEROL): ONCE A WEEK OR LESS
QUESTION_5 LAST FOUR WEEKS HOW WOULD YOU RATE YOUR ASTHMA CONTROL: WELL CONTROLLED
ACT_TOTALSCORE: 20
QUESTION_3 LAST FOUR WEEKS HOW OFTEN DID YOUR ASTHMA SYMPTOMS (WHEEZING, COUGHING, SHORTNESS OF BREATH, CHEST TIGHTNESS OR PAIN) WAKE YOU UP AT NIGHT OR EARLIER THAN USUAL IN THE MORNING: ONCE OR TWICE
ACT_TOTALSCORE: 20
QUESTION_1 LAST FOUR WEEKS HOW MUCH OF THE TIME DID YOUR ASTHMA KEEP YOU FROM GETTING AS MUCH DONE AT WORK, SCHOOL OR AT HOME: A LITTLE OF THE TIME

## 2024-09-05 ENCOUNTER — VIRTUAL VISIT (OUTPATIENT)
Dept: PULMONOLOGY | Facility: CLINIC | Age: 34
End: 2024-09-05
Attending: NURSE PRACTITIONER
Payer: COMMERCIAL

## 2024-09-05 VITALS — BODY MASS INDEX: 34.16 KG/M2 | WEIGHT: 244 LBS | HEIGHT: 71 IN

## 2024-09-05 DIAGNOSIS — Z91.09 ENVIRONMENTAL ALLERGIES: ICD-10-CM

## 2024-09-05 DIAGNOSIS — J45.50 SEVERE PERSISTENT ASTHMA WITHOUT COMPLICATION (H): Primary | ICD-10-CM

## 2024-09-05 PROCEDURE — 99213 OFFICE O/P EST LOW 20 MIN: CPT | Mod: 95 | Performed by: NURSE PRACTITIONER

## 2024-09-05 ASSESSMENT — PAIN SCALES - GENERAL: PAINLEVEL: NO PAIN (0)

## 2024-09-05 NOTE — PROGRESS NOTES
Virtual Visit Details    Type of service:  Video Visit   Start time:  8:03am  End time:  8:10am  Originating Location (pt. Location): Home    Distant Location (provider location):  On-site  Platform used for Video Visit: [facility]     Pulmonary [facility] Follow-up          Assessment/Plan:     33 year old male with a history of asthma, allergic rhinitis, ROMAN on cpap, GERD - presenting for 6 week follow-up.    Severe persistent asthma  Allergic rhinitis  Eosinophilia  Environmental allergies  Former smoker x15 years, quit 2022.  Asthma diagnosis in childhood, hx of environmental allergies and eosinophilia in past.  Absolute eosinophils 0.5 in 2016, and 0.6 in 2018 and 2019.  He was previously followed by Dr Ellis at [facility], now switching to our clinic.  He has used Qvar, Advair, zafirlukast, montelukast in past.  Montelukast caused worsening GERD symptoms.  He now feels that his asthma is out of control, with increased shortness of breath.  He is unsure why he stopped zafirlukast, but found benefit from this.  He has taken prilosec in past with benefit.  Recent diagnosis of ROMAN, now on cpap.  Pulmonary function testing shows normal spirometry, lung volumes, and diffusion capacity.  No recent lung imaging on file.  FeNO in clinic was 19.  Eosinophils 0.3, IgE 15 - on 7/25/24.  Last visit we increased his regimen from Advair to Trelegy Ellipta.  He reports improvement in symptoms since that time.  ACT 20.     Plan:  - continue Trelegy Ellipta (fluticasone/umeclidinium/vilanterol) 200/62.5/25, one inhalation daily, rinse/gargle after use.   - continue Albuterol inhaler or Duonebs PRN.   - continue cpap  - may need better management of GERD, as this could be exacerbating asthma control as well.  - discussed peak flow meter, he has multiple at home, asked him to use this to develop baseline number and so we can monitor.  - Action plan: prednisone 40mg x5 days.      Follow-up  - 3 months      Sirena Pratt, KLAUDIA  Pulmonary  [facility]  [phone]       CC:     Asthma follow-up     HPI:     33 year old male with a history of asthma, allergic rhinitis, ROMAN on cpap, GERD - presenting for 6 week follow-up.    Trelegy working much better.  Doesn't have random SOB after activity.  Still gets weird sensation in back of throat due to powder.   Inhaler is expensive.     Previous HPI:  Asthma as young child, grew out of this for awhile, then 'came back'.    Frequent illness:  chest tightness with URIs, was going in every couple months for steroids, somewhat better recently.   Triggers:  humid weather, activity  Allergies:  allergy testing blood work, pollen.    Allergy symptoms:   stuffy nose  Medications for allergies:  none now, was on singulair, didn't help much and caused reflu, stopped.  Takes OTC antihistamine PRN.   Smoking hx:  smoked x15 years, 1 PPD.  quit 2/2022.  when quit, for few months felt great, then started feeling worse.   Inhalers tried:  advair x1 year, was helping for a little while, doesn't feel like it's doing much.  Was on qvar, switched because of cost.   Symptoms:  SOB, can't keep up with son.   Labs done in past: eosinophilia  Spirometry/methacholine challenge done in past: normal  Hx of nasal polyps, atopic dermatitis, ROMAN, GERD:  singulair gave him bad heart burn, has GERD symptoms few times/week, chest tightness/burning.  Was on prilosec x14 days, helped, no longer.  ROMAN, cpap machine 5 months ago.  Doesn't help with mucous in morning, coughs a lot in morning.             11/29/2023     9:19 AM 7/19/2024     9:19 PM 8/31/2024    12:10 PM   ACT Total Scores   ACT TOTAL SCORE (Goal Greater than or Equal to 20) 21 17 20   In the past 12 months, how many times did you visit the emergency room for your asthma without being admitted to the hospital? 0 0 0   In the past 12 months, how many times were you hospitalized overnight because of your asthma? 0 0 0          ROS:      A 4-system review was obtained and was negative with the exception of the symptoms endorsed in the HPI.       Medical history:       PMH:  Past Medical History:   Diagnosis Date    Allergic rhinitis, cause unspecified     Attention deficit disorder without mention of hyperactivity        PSH:  Past Surgical History:   Procedure Laterality Date    NO HISTORY OF SURGERY         Allergies:  No Known Allergies    Family Hx:  Family History   Problem Relation Age of Onset    Family History Negative No family hx of     Diabetes No family hx of     Cerebrovascular Disease No family hx of     Hypertension No family hx of     Breast Cancer No family hx of     Cancer - colorectal No family hx of     Prostate Cancer No family hx of     C.A.D. No family hx of     Lipids Father     Cancer Paternal Grandmother        Social Hx:  Social History     Socioeconomic History    Marital status:      Spouse name: Not on file    Number of children: Not on file    Years of education: Not on file    Highest education level: Not on file   Occupational History    Not on file   Tobacco Use    Smoking status: Former     Current packs/day: 0.00     Average packs/day: 0.3 packs/day for 4.0 years (1.0 ttl pk-yrs)     Types: Cigarettes     Start date: 2/15/2017     Quit date: 2/15/2021     Years since quitting: 3.5     Passive exposure: Never    Smokeless tobacco: Never   Vaping Use    Vaping status: Never Used   Substance and Sexual Activity    Alcohol use: No    Drug use: No    Sexual activity: Not on file   Other Topics Concern    Parent/sibling w/ CABG, MI or angioplasty before 65F 55M? Not Asked   Social History Narrative    Not on file     Social Determinants of Health     Financial Resource Strain: Low Risk  (11/29/2023)    Financial Resource Strain     Within the past 12 months, have you or your family members you live with been unable to get utilities (heat, electricity) when it was really needed?: No   Food Insecurity: High  Risk (11/29/2023)    Food Insecurity     Within the past 12 months, did you worry that your food would run out before you got money to buy more?: Yes     Within the past 12 months, did the food you bought just not last and you didn t have money to get more?: Yes   Transportation Needs: Low Risk  (11/29/2023)    Transportation Needs     Within the past 12 months, has lack of transportation kept you from medical appointments, getting your medicines, non-medical meetings or appointments, work, or from getting things that you need?: No   Physical Activity: Not on file   Stress: Not on file   Social Connections: Unknown (1/1/2022)    [facility]    Social Connections     Frequency of Communication with Friends and Family: Not on file   Interpersonal Safety: Low Risk  (11/29/2023)    Interpersonal Safety     Do you feel physically and emotionally safe where you currently live?: Yes     Within the past 12 months, have you been hit, slapped, kicked or otherwise physically hurt by someone?: No     Within the past 12 months, have you been humiliated or emotionally abused in other ways by your partner or ex-partner?: No   Housing Stability: Low Risk  (11/29/2023)    Housing Stability     Do you have housing? : Yes     Are you worried about losing your housing?: No       Current Meds:  Current Outpatient Medications   Medication Sig Dispense Refill    albuterol (PROAIR HFA/PROVENTIL HFA/VENTOLIN HFA) 108 (90 Base) MCG/ACT inhaler Inhale 2 puffs into the lungs every 6 hours as needed for shortness of breath or wheezing 18 g 11    fluticasone-salmeterol (ADVAIR DISKUS) 250-50 MCG/ACT inhaler USE 1 INHALATION EVERY 12 HOURS 3 each 3    Fluticasone-Umeclidin-Vilanterol (TRELEGY ELLIPTA) 200-62.5-25 MCG/ACT oral inhaler Inhale 1 puff into the lungs daily 84 each 3    IBUPROFEN 200 200 MG OR TABS Take 2 tablets by mouth as needed       "ipratropium - albuterol 0.5 mg/2.5 mg/3 mL (DUONEB) 0.5-2.5 (3) MG/3ML neb solution Take 1 vial (3 mLs) by nebulization every 6 hours as needed for shortness of breath or wheezing 180 mL 11          Physical Exam:     Ht 1.803 m (5' 11\")   Wt 110.7 kg (244 lb)   BMI 34.03 kg/m    Gen: adult male, appears in NAD  Resp: Respirations even and unlabored.  On RA.  No cough.  Neuro: alert and answering questions appropriately       Data:     Labs:  reviewed        Pulmonary Function Testing    7/24/24:      Spirometry Results 6/14/2019 ([facility])  FVC (Pre) 4.69   FVC % Predicted (Pre) 83   FEV1 (Pre) 3.24   FEV1 % Predicted (Pre) 70   FEV1/FVC % (Pre) 69.08   Valid test (Pre) Yes   FVC (Post) 4.73   FVC % Predicted (Post) 84   FEV1 (Post) 3.6   FEV1 % Predicted (Post) 78   FEV1/FVC % (Post) 76.11   POST-bronchodilator FEV1% improvement 11   Valid test (Post) Yes   "

## 2024-09-05 NOTE — NURSING NOTE
Current patient location: [id number]  [facility] [address]    Is the patient currently in the state of [place]? YES    Visit mode:VIDEO    If the visit is dropped, the patient can be reconnected by: VIDEO VISIT: Text to cell phone:   Telephone Information:   Mobile [phone]       Will anyone else be joining the visit? NO  (If patient encounters technical issues they should call [phone]    How would you like to obtain your AVS? MyChart    Are changes needed to the allergy or medication list? Pt stated no changes to allergies and Pt stated no med changes    Are refills needed on medications prescribed by this physician? NO    Rooming Documentation:  Questionnaire(s) completed    Reason for visit: RECHECK     Roxanne LACEY

## 2024-10-09 SDOH — HEALTH STABILITY: PHYSICAL HEALTH: ON AVERAGE, HOW MANY MINUTES DO YOU ENGAGE IN EXERCISE AT THIS LEVEL?: 30 MIN

## 2024-10-09 SDOH — HEALTH STABILITY: PHYSICAL HEALTH: ON AVERAGE, HOW MANY DAYS PER WEEK DO YOU ENGAGE IN MODERATE TO STRENUOUS EXERCISE (LIKE A BRISK WALK)?: 4 DAYS

## 2024-10-09 ASSESSMENT — ASTHMA QUESTIONNAIRES
QUESTION_2 LAST FOUR WEEKS HOW OFTEN HAVE YOU HAD SHORTNESS OF BREATH: ONCE OR TWICE A WEEK
QUESTION_5 LAST FOUR WEEKS HOW WOULD YOU RATE YOUR ASTHMA CONTROL: WELL CONTROLLED
ACT_TOTALSCORE: 21
QUESTION_1 LAST FOUR WEEKS HOW MUCH OF THE TIME DID YOUR ASTHMA KEEP YOU FROM GETTING AS MUCH DONE AT WORK, SCHOOL OR AT HOME: A LITTLE OF THE TIME
QUESTION_3 LAST FOUR WEEKS HOW OFTEN DID YOUR ASTHMA SYMPTOMS (WHEEZING, COUGHING, SHORTNESS OF BREATH, CHEST TIGHTNESS OR PAIN) WAKE YOU UP AT NIGHT OR EARLIER THAN USUAL IN THE MORNING: NOT AT ALL
ACT_TOTALSCORE: 21
QUESTION_4 LAST FOUR WEEKS HOW OFTEN HAVE YOU USED YOUR RESCUE INHALER OR NEBULIZER MEDICATION (SUCH AS ALBUTEROL): ONCE A WEEK OR LESS

## 2024-10-09 ASSESSMENT — SOCIAL DETERMINANTS OF HEALTH (SDOH): HOW OFTEN DO YOU GET TOGETHER WITH FRIENDS OR RELATIVES?: ONCE A WEEK

## 2024-10-14 ENCOUNTER — OFFICE VISIT (OUTPATIENT)
Dept: FAMILY MEDICINE | Facility: CLINIC | Age: 34
End: 2024-10-14
Payer: COMMERCIAL

## 2024-10-14 VITALS
HEART RATE: 72 BPM | RESPIRATION RATE: 16 BRPM | SYSTOLIC BLOOD PRESSURE: 120 MMHG | TEMPERATURE: 98.6 F | OXYGEN SATURATION: 98 % | DIASTOLIC BLOOD PRESSURE: 76 MMHG | HEIGHT: 71 IN | BODY MASS INDEX: 33.32 KG/M2 | WEIGHT: 238 LBS

## 2024-10-14 DIAGNOSIS — Z00.00 WELL ADULT EXAM: Primary | ICD-10-CM

## 2024-10-14 DIAGNOSIS — Z23 NEED FOR PROPHYLACTIC VACCINATION AND INOCULATION AGAINST INFLUENZA: ICD-10-CM

## 2024-10-14 DIAGNOSIS — E66.09 CLASS 1 OBESITY DUE TO EXCESS CALORIES WITHOUT SERIOUS COMORBIDITY WITH BODY MASS INDEX (BMI) OF 33.0 TO 33.9 IN ADULT: ICD-10-CM

## 2024-10-14 DIAGNOSIS — E66.811 CLASS 1 OBESITY DUE TO EXCESS CALORIES WITHOUT SERIOUS COMORBIDITY WITH BODY MASS INDEX (BMI) OF 33.0 TO 33.9 IN ADULT: ICD-10-CM

## 2024-10-14 PROBLEM — M25.559 PAIN IN JOINT INVOLVING PELVIC REGION AND THIGH: Status: RESOLVED | Noted: 2023-12-11 | Resolved: 2024-10-14

## 2024-10-14 PROBLEM — H52.7 REFRACTIVE ERROR: Status: RESOLVED | Noted: 2023-12-11 | Resolved: 2024-10-14

## 2024-10-14 PROBLEM — F17.200 TOBACCO USE DISORDER: Status: RESOLVED | Noted: 2023-12-11 | Resolved: 2024-10-14

## 2024-10-14 PROCEDURE — 90656 IIV3 VACC NO PRSV 0.5 ML IM: CPT | Performed by: FAMILY MEDICINE

## 2024-10-14 PROCEDURE — 90471 IMMUNIZATION ADMIN: CPT | Performed by: FAMILY MEDICINE

## 2024-10-14 PROCEDURE — 99213 OFFICE O/P EST LOW 20 MIN: CPT | Mod: 25 | Performed by: FAMILY MEDICINE

## 2024-10-14 PROCEDURE — 99395 PREV VISIT EST AGE 18-39: CPT | Mod: 25 | Performed by: FAMILY MEDICINE

## 2024-10-14 RX ORDER — SEMAGLUTIDE 0.5 MG/.5ML
0.5 INJECTION, SOLUTION SUBCUTANEOUS WEEKLY
Qty: 2 ML | Refills: 0 | Status: SHIPPED | OUTPATIENT
Start: 2024-11-11 | End: 2024-12-09

## 2024-10-14 RX ORDER — SEMAGLUTIDE 1 MG/.5ML
1 INJECTION, SOLUTION SUBCUTANEOUS WEEKLY
Qty: 6 ML | Refills: 3 | Status: SHIPPED | OUTPATIENT
Start: 2024-12-13

## 2024-10-14 RX ORDER — SEMAGLUTIDE 0.25 MG/.5ML
0.25 INJECTION, SOLUTION SUBCUTANEOUS WEEKLY
Qty: 2 ML | Refills: 0 | Status: SHIPPED | OUTPATIENT
Start: 2024-10-14 | End: 2024-11-11

## 2024-10-14 ASSESSMENT — PAIN SCALES - GENERAL: PAINLEVEL: NO PAIN (0)

## 2024-10-14 NOTE — PROGRESS NOTES
"Preventive Care Visit  [facility]  Hola Marquez MD, Family Medicine  Oct 14, 2024      Assessment & Plan   Well exam  Obesity    Wants to try wegovy.  Thinks insurance will pay.      No labs due.  Wants flu shot, no covid booster.        BMI  Estimated body mass index is 33.19 kg/m  as calculated from the following:    Height as of this encounter: 1.803 m (5' 11\").    Weight as of this encounter: 108 kg (238 lb).   Weight management plan: see above    Counseling  Appropriate preventive services were addressed with this patient via screening, questionnaire, or discussion as appropriate for fall prevention, nutrition, physical activity, Tobacco-use cessation, social engagement, weight loss and cognition.  Checklist reviewing preventive services available has been given to the patient.  Reviewed patient's diet, addressing concerns and/or questions.           Kelly Schwartz is a 33 year old, presenting for the following:  Physical    Obesity; wants to try wegovy          10/14/2024     9:43 AM   Additional Questions   Roomed by yeny   Accompanied by self        Health Care Directive  Patient does not have a Health Care Directive or Living Will:             10/9/2024   General Health   How would you rate your overall physical health? (!) FAIR   Feel stress (tense, anxious, or unable to sleep) To some extent      (!) STRESS CONCERN      10/9/2024   Nutrition   Three or more servings of calcium each day? Yes   Diet: Regular (no restrictions)   How many servings of fruit and vegetables per day? (!) 2-3   How many sweetened beverages each day? 0-1            10/9/2024   Exercise   Days per week of moderate/strenous exercise 4 days   Average minutes spent exercising at this level 30 min            10/9/2024   Social Factors   Frequency of gathering with friends or relatives Once a week   Worry food won't last until get money to buy more No   Food not last or not have enough money for " food? Yes   Do you have housing? (Housing is defined as stable permanent housing and does not include staying ouside in a car, in a tent, in an abandoned building, in an overnight shelter, or couch-surfing.) Yes   Are you worried about losing your housing? No   Lack of transportation? No   Unable to get utilities (heat,electricity)? No      (!) FOOD SECURITY CONCERN PRESENT      10/9/2024   Dental   Dentist two times every year? Yes            10/9/2024   TB Screening   Were you born outside of the [place]? No                10/9/2024   Substance Use   Alcohol more than 3/day or more than 7/wk No   Do you use any other substances recreationally? No        Social History     Tobacco Use    Smoking status: Former     Current packs/day: 0.00     Average packs/day: 0.3 packs/day for 4.0 years (1.0 ttl pk-yrs)     Types: Cigarettes     Start date: 2/15/2017     Quit date: 2/15/2021     Years since quitting: 3.6     Passive exposure: Never    Smokeless tobacco: Never   Vaping Use    Vaping status: Never Used   Substance Use Topics    Alcohol use: No    Drug use: No           10/9/2024   One time HIV Screening   Previous HIV test? Yes          10/9/2024   STI Screening   New sexual partner(s) since last STI/HIV test? No            10/9/2024   Contraception/Family Planning   Questions about contraception or family planning No           Reviewed and updated as needed this visit by Provider                        Gen: no unexpected wt loss or fevers.    ENT: vision ok, no hearing changes, no lumps noted in neck or mouth  CV: no chest pain or SOB, no palpitations  RESP: No wheezing or pleuritic pain no cough  GI: no nausea or vomiting, no abd pain.  No blood in stool.   : no dysuria or hematuria. No urinary retention.  No lesions on genitals noted.    MUSC: moving all extremities, no edema  ENDO: no excessive thirst or urination.    NEURO: no difficulty speaking, no focal weakness or changes in sensation.  No balance troubles.  "  PSYCH: mood stable,  no significant problems with anxiety  SKIN: no worrisome rashes or lesions       Objective    Exam  /76   Pulse 72   Temp 98.6  F (37  C) (Tympanic)   Resp 16   Ht 1.803 m (5' 11\")   Wt 108 kg (238 lb)   SpO2 98%   BMI 33.19 kg/m     Estimated body mass index is 34.03 kg/m  as calculated from the following:    Height as of 9/5/24: 1.803 m (5' 11\").    Weight as of 9/5/24: 110.7 kg (244 lb).  Gen: alert and oriented, in no acute distress, affect within normal limits  Neck: supple with no masses or nodes  Throat: oropharynx clear, no exudate or tonsillar/palate asymmetry.    CV: RRR, no murmur  Lungs: clear bilaterally with good effort  Abd: nontender, no mass  Ext: no edema or lesions   Neuro: moving all extremities, gait normal, no focal deficts noted          Signed Electronically by: Hola Marquez MD    "

## 2024-12-06 ASSESSMENT — ASTHMA QUESTIONNAIRES
ACT_TOTALSCORE: 23
QUESTION_1 LAST FOUR WEEKS HOW MUCH OF THE TIME DID YOUR ASTHMA KEEP YOU FROM GETTING AS MUCH DONE AT WORK, SCHOOL OR AT HOME: A LITTLE OF THE TIME
QUESTION_5 LAST FOUR WEEKS HOW WOULD YOU RATE YOUR ASTHMA CONTROL: WELL CONTROLLED
ACT_TOTALSCORE: 23
QUESTION_4 LAST FOUR WEEKS HOW OFTEN HAVE YOU USED YOUR RESCUE INHALER OR NEBULIZER MEDICATION (SUCH AS ALBUTEROL): NOT AT ALL
QUESTION_3 LAST FOUR WEEKS HOW OFTEN DID YOUR ASTHMA SYMPTOMS (WHEEZING, COUGHING, SHORTNESS OF BREATH, CHEST TIGHTNESS OR PAIN) WAKE YOU UP AT NIGHT OR EARLIER THAN USUAL IN THE MORNING: NOT AT ALL
QUESTION_2 LAST FOUR WEEKS HOW OFTEN HAVE YOU HAD SHORTNESS OF BREATH: NOT AT ALL

## 2024-12-08 ENCOUNTER — OFFICE VISIT (OUTPATIENT)
Dept: URGENT CARE | Facility: URGENT CARE | Age: 34
End: 2024-12-08
Payer: COMMERCIAL

## 2024-12-08 VITALS
BODY MASS INDEX: 30.96 KG/M2 | SYSTOLIC BLOOD PRESSURE: 112 MMHG | RESPIRATION RATE: 16 BRPM | TEMPERATURE: 98.9 F | OXYGEN SATURATION: 95 % | WEIGHT: 222 LBS | DIASTOLIC BLOOD PRESSURE: 76 MMHG | HEART RATE: 108 BPM

## 2024-12-08 DIAGNOSIS — R10.9 ABDOMINAL CRAMPS: ICD-10-CM

## 2024-12-08 DIAGNOSIS — R11.2 NAUSEA AND VOMITING, UNSPECIFIED VOMITING TYPE: Primary | ICD-10-CM

## 2024-12-08 PROCEDURE — 99213 OFFICE O/P EST LOW 20 MIN: CPT | Performed by: EMERGENCY MEDICINE

## 2024-12-08 RX ORDER — DICYCLOMINE HCL 20 MG
20 TABLET ORAL EVERY 6 HOURS
Qty: 6 TABLET | Refills: 0 | Status: SHIPPED | OUTPATIENT
Start: 2024-12-08

## 2024-12-08 RX ORDER — ONDANSETRON 4 MG/1
4 TABLET, ORALLY DISINTEGRATING ORAL EVERY 8 HOURS PRN
Qty: 6 TABLET | Refills: 0 | Status: SHIPPED | OUTPATIENT
Start: 2024-12-08

## 2024-12-08 NOTE — PATIENT INSTRUCTIONS
Small sips and eats  Zofran for nausea  Bentyl for cramps  Immodium for diarrhea  Recheck 24 hr if not betterl

## 2024-12-08 NOTE — LETTER
December 8, 2024      Efren Rosas  [id number] [address] [id number]        To Whom It May Concern:    Efren Rosas  was seen on 12/8/24.  Please excuse him  until 12/10/24, due to illness.        Sincerely,        Jovan Brito MD

## 2024-12-08 NOTE — PROGRESS NOTES
CHIEF COMPLAINT: Vomiting diarrhea abdominal cramps      HPI: Patient is a 33-year-old male who became ill 2 days ago with the onset of nausea, vomiting and diarrhea.  Symptoms seem to wax and wane and at times able to take fluids and bland foods which trigger repeat vomiting and diarrhea.  No suspicious foods.  Wife has been eating same foods.  No recent travel.  He is on Wegovy but no recent dose change.  He has no chronic GI illness.      ROS: See HPI otherwise normal.    No Known Allergies   Current Outpatient Medications   Medication Sig Dispense Refill    albuterol (PROAIR HFA/PROVENTIL HFA/VENTOLIN HFA) 108 (90 Base) MCG/ACT inhaler Inhale 2 puffs into the lungs every 6 hours as needed for shortness of breath or wheezing 18 g 11    dicyclomine (BENTYL) 20 MG tablet Take 1 tablet (20 mg) by mouth every 6 hours. 6 tablet 0    Fluticasone-Umeclidin-Vilanterol (TRELEGY ELLIPTA) 200-62.5-25 MCG/ACT oral inhaler Inhale 1 puff into the lungs daily 84 each 3    IBUPROFEN 200 200 MG OR TABS Take 2 tablets by mouth as needed      insulin pen needle (32G X 4 MM) 32G X 4 MM miscellaneous Use 1 pen needles daily or as directed. 90 each 1    ipratropium - albuterol 0.5 mg/2.5 mg/3 mL (DUONEB) 0.5-2.5 (3) MG/3ML neb solution Take 1 vial (3 mLs) by nebulization every 6 hours as needed for shortness of breath or wheezing 180 mL 11    ondansetron (ZOFRAN ODT) 4 MG ODT tab Take 1 tablet (4 mg) by mouth every 8 hours as needed for nausea. 6 tablet 0    Semaglutide-Weight Management (WEGOVY) 0.5 MG/0.5ML pen Inject 0.5 mg subcutaneously once a week for 28 days. 2 mL 0    [START ON 12/13/2024] Semaglutide-Weight Management (WEGOVY) 1 MG/0.5ML pen Inject 1 mg subcutaneously once a week. 6 mL 3         PE: No acute distress.  Vital signs normal.  Overall he appears well-hydrated.  Examination of his abdomen does reveal some diffuse but mild tenderness.  The abdomen is soft with no peritoneal irritation.        TREATMENT:  None.      ASSESSMENT: Vomiting cramps and diarrhea most likely viral etiology.  Patient maintains good hydration and outpatient plan is reasonable with short-term follow-up if not better.      DIAGNOSIS: Vomiting, diarrhea, abdominal cramps      PLAN: Zofran, Bentyl for cramps, Imodium A-D.  Marked restrictions of oral intake discuss to be seen in 24 hours if not better

## 2024-12-11 ENCOUNTER — VIRTUAL VISIT (OUTPATIENT)
Dept: PULMONOLOGY | Facility: CLINIC | Age: 34
End: 2024-12-11
Attending: NURSE PRACTITIONER
Payer: COMMERCIAL

## 2024-12-11 VITALS — BODY MASS INDEX: 30.96 KG/M2 | WEIGHT: 222 LBS

## 2024-12-11 DIAGNOSIS — Z91.09 ENVIRONMENTAL ALLERGIES: ICD-10-CM

## 2024-12-11 DIAGNOSIS — J45.50 SEVERE PERSISTENT ASTHMA WITHOUT COMPLICATION (H): Primary | ICD-10-CM

## 2024-12-11 DIAGNOSIS — D72.19 OTHER EOSINOPHILIA: ICD-10-CM

## 2024-12-11 PROCEDURE — 99213 OFFICE O/P EST LOW 20 MIN: CPT | Mod: 95 | Performed by: NURSE PRACTITIONER

## 2024-12-11 RX ORDER — PREDNISONE 20 MG/1
40 TABLET ORAL DAILY
Qty: 10 TABLET | Refills: 0 | Status: SHIPPED | OUTPATIENT
Start: 2024-12-11 | End: 2024-12-16

## 2024-12-11 ASSESSMENT — PAIN SCALES - GENERAL: PAINLEVEL_OUTOF10: MODERATE PAIN (4)

## 2024-12-11 NOTE — PROGRESS NOTES
Virtual Visit Details    Type of service:  Video Visit   Start time:  10:45am  End time:  10:49am  Originating Location (pt. Location): Home    Distant Location (provider location):  On-site  Platform used for Video Visit: [facility]

## 2024-12-11 NOTE — NURSING NOTE
Current patient location: [id number]  [facility] [address]    Is the patient currently in the state of [place]? YES    Visit mode:VIDEO    If the visit is dropped, the patient can be reconnected by:VIDEO VISIT: Text to cell phone:   Telephone Information:   Mobile [phone]       Will anyone else be joining the visit? NO  (If patient encounters technical issues they should call [phone]    Are changes needed to the allergy or medication list? Pt stated no changes to allergies and Pt stated no med changes    Are refills needed on medications prescribed by this physician? NO    Rooming Documentation:  Questionnaire(s) completed    Reason for visit: RECHECK    Cori PEREZF

## 2024-12-11 NOTE — PROGRESS NOTES
Pulmonary Clinic Follow-Up          Assessment/Plan:     33 year old male with a history of asthma, allergic rhinitis, ROMAN on cpap, GERD - presenting for 3 month follow-up.    Severe persistent asthma  Allergic rhinitis  Eosinophilia  Environmental allergies  Former smoker x15 years, quit 2022.  Asthma diagnosis in childhood, hx of environmental allergies and eosinophilia in past.  Absolute eosinophils 0.5 in 2016, and 0.6 in 2018 and 2019.  He was previously followed by Dr Ellis at [facility], now switching to our clinic.  He has used Qvar, Advair, zafirlukast, montelukast in past.  Montelukast caused worsening GERD symptoms.  He is unsure why he stopped zafirlukast, but found benefit from this.  He has taken prilosec in past with benefit.  Recent diagnosis of ROMAN, now on cpap.  Pulmonary function testing shows normal spirometry, lung volumes, and diffusion capacity.  No recent lung imaging on file.  FeNO in clinic was 19.  Eosinophils 0.3, IgE 15 on 7/25/24.  Previous visit we increased his regimen from Advair to Trelegy Ellipta.  He reports improvement in symptoms since that time.  ACT 23.     Plan:  - continue Trelegy Ellipta (fluticasone/umeclidinium/vilanterol) 200/62.5/25, one inhalation daily, rinse/gargle after use.   - continue Albuterol inhaler or Duonebs PRN.   - continue cpap  - may need better management of GERD, as this could be exacerbating asthma control as well.  - previously discussed peak flow meter, he has multiple at home, asked him to use this to develop baseline number and so we can monitor.  - Action plan: prednisone 40mg x5 days.      Follow-up  - 6 months      Sirena Pratt CNP  Pulmonary Medicine  [facility]  [phone]       CC:     Asthma follow-up     HPI:     33 year old male with a history of asthma, allergic rhinitis, ROMAN on cpap, GERD - presenting for 6 week follow-up.    Breathing is 'great'.   Did duoneb recently, due to weather changes.   Otherwise not requiring much.   Had a recent stomach virus.  Trelegy wasn't expensive.  Working well, rinsing mouth.       Previous HPI:  Asthma as young child, grew out of this for awhile, then 'came back'.    Frequent illness:  chest tightness with URIs, was going in every couple months for steroids, somewhat better recently.   Triggers:  humid weather, activity  Allergies:  allergy testing blood work, pollen.    Allergy symptoms:   stuffy nose  Medications for allergies:  none now, was on singulair, didn't help much and caused reflu, stopped.  Takes OTC antihistamine PRN.   Smoking hx:  smoked x15 years, 1 PPD.  quit 2/2022.  when quit, for few months felt great, then started feeling worse.   Inhalers tried:  advair x1 year, was helping for a little while, doesn't feel like it's doing much.  Was on qvar, switched because of cost.   Symptoms:  SOB, can't keep up with son.   Labs done in past: eosinophilia  Spirometry/methacholine challenge done in past: normal  Hx of nasal polyps, atopic dermatitis, ROMAN, GERD:  singulair gave him bad heart burn, has GERD symptoms few times/week, chest tightness/burning.  Was on prilosec x14 days, helped, no longer.  ROMAN, cpap machine 5 months ago.  Doesn't help with mucous in morning, coughs a lot in morning.             8/31/2024    12:10 PM 10/9/2024     8:14 PM 12/6/2024     9:03 AM   ACT Total Scores   ACT TOTAL SCORE (Goal Greater than or Equal to 20) 20 21 23    In the past 12 months, how many times did you visit the emergency room for your asthma without being admitted to the hospital? 0  0  0    In the past 12 months, how many times were you hospitalized overnight because of your asthma? 0  0  0        Patient-reported          ROS:     A 4-system review was obtained and was negative with the exception of the symptoms endorsed in the HPI.       Medical history:       PMH:  Past Medical History:   Diagnosis Date    Allergic rhinitis, cause unspecified     Attention deficit  disorder without mention of hyperactivity        PSH:  Past Surgical History:   Procedure Laterality Date    NO HISTORY OF SURGERY         Allergies:  No Known Allergies    Family Hx:  Family History   Problem Relation Age of Onset    Family History Negative No family hx of     Diabetes No family hx of     Cerebrovascular Disease No family hx of     Hypertension No family hx of     Breast Cancer No family hx of     Cancer - colorectal No family hx of     Prostate Cancer No family hx of     C.A.D. No family hx of     Lipids Father     Cancer Paternal Grandmother        Social Hx:  Social History     Socioeconomic History    Marital status:      Spouse name: Not on file    Number of children: Not on file    Years of education: Not on file    Highest education level: Not on file   Occupational History    Not on file   Tobacco Use    Smoking status: Former     Current packs/day: 0.00     Average packs/day: 0.3 packs/day for 4.0 years (1.0 ttl pk-yrs)     Types: Cigarettes     Start date: 2/15/2017     Quit date: 2/15/2021     Years since quitting: 3.8     Passive exposure: Never    Smokeless tobacco: Never   Vaping Use    Vaping status: Never Used   Substance and Sexual Activity    Alcohol use: No    Drug use: No    Sexual activity: Not on file   Other Topics Concern    Parent/sibling w/ CABG, MI or angioplasty before 65F 55M? Not Asked   Social History Narrative    Not on file     Social Drivers of Health     Financial Resource Strain: Low Risk  (10/9/2024)    Financial Resource Strain     Within the past 12 months, have you or your family members you live with been unable to get utilities (heat, electricity) when it was really needed?: No   Food Insecurity: High Risk (10/9/2024)    Food Insecurity     Within the past 12 months, did you worry that your food would run out before you got money to buy more?: Yes     Within the past 12 months, did the food you bought just not last and you didn t have money to get  more?: No   Transportation Needs: Low Risk  (10/9/2024)    Transportation Needs     Within the past 12 months, has lack of transportation kept you from medical appointments, getting your medicines, non-medical meetings or appointments, work, or from getting things that you need?: No   Physical Activity: Insufficiently Active (10/9/2024)    Exercise Vital Sign     Days of Exercise per Week: 4 days     Minutes of Exercise per Session: 30 min   Stress: Stress Concern Present (10/9/2024)    [place] [employer] - Occupational Stress Questionnaire     Feeling of Stress : To some extent   Social Connections: Unknown (10/9/2024)    Social Connection and Isolation Panel [NHANES]     Frequency of Communication with Friends and Family: Not on file     Frequency of Social Gatherings with Friends and Family: Once a week     Attends Catholic Services: Not on file     Active Member of Clubs or Organizations: Not on file     Attends Club or Organization Meetings: Not on file     Marital Status: Not on file   Interpersonal Safety: Low Risk  (10/14/2024)    Interpersonal Safety     Do you feel physically and emotionally safe where you currently live?: Yes     Within the past 12 months, have you been hit, slapped, kicked or otherwise physically hurt by someone?: No     Within the past 12 months, have you been humiliated or emotionally abused in other ways by your partner or ex-partner?: No   Housing Stability: Low Risk  (10/9/2024)    Housing Stability     Do you have housing? : Yes     Are you worried about losing your housing?: No       Current Meds:  Current Outpatient Medications   Medication Sig Dispense Refill    albuterol (PROAIR HFA/PROVENTIL HFA/VENTOLIN HFA) 108 (90 Base) MCG/ACT inhaler Inhale 2 puffs into the lungs every 6 hours as needed for shortness of breath or wheezing 18 g 11    dicyclomine (BENTYL) 20 MG tablet Take 1 tablet (20 mg) by mouth every 6 hours. 6 tablet 0     Fluticasone-Umeclidin-Vilanterol (TRELEGY ELLIPTA) 200-62.5-25 MCG/ACT oral inhaler Inhale 1 puff into the lungs daily 84 each 3    IBUPROFEN 200 200 MG OR TABS Take 2 tablets by mouth as needed      insulin pen needle (32G X 4 MM) 32G X 4 MM miscellaneous Use 1 pen needles daily or as directed. 90 each 1    ipratropium - albuterol 0.5 mg/2.5 mg/3 mL (DUONEB) 0.5-2.5 (3) MG/3ML neb solution Take 1 vial (3 mLs) by nebulization every 6 hours as needed for shortness of breath or wheezing 180 mL 11    ondansetron (ZOFRAN ODT) 4 MG ODT tab Take 1 tablet (4 mg) by mouth every 8 hours as needed for nausea. 6 tablet 0    [START ON 12/13/2024] Semaglutide-Weight Management (WEGOVY) 1 MG/0.5ML pen Inject 1 mg subcutaneously once a week. 6 mL 3          Physical Exam:     There were no vitals taken for this visit.  Gen: adult male, appears in NAD  Resp: Respirations even and unlabored.  On RA.  No cough.  Neuro: alert and answering questions appropriately       Data:     Labs:  reviewed        Pulmonary Function Testing    7/24/24:      Spirometry Results 6/14/2019 ([facility])  FVC (Pre) 4.69   FVC % Predicted (Pre) 83   FEV1 (Pre) 3.24   FEV1 % Predicted (Pre) 70   FEV1/FVC % (Pre) 69.08   Valid test (Pre) Yes   FVC (Post) 4.73   FVC % Predicted (Post) 84   FEV1 (Post) 3.6   FEV1 % Predicted (Post) 78   FEV1/FVC % (Post) 76.11   POST-bronchodilator FEV1% improvement 11   Valid test (Post) Yes

## 2024-12-11 NOTE — PATIENT INSTRUCTIONS
Continue Trelegy Ellipta one puff daily, rinse/gargle after use.   Continue Albuterol or Duonebs every 4-6 hours as needed for shortness of breath or wheezing.  Call my nurse, Col[facility] ([phone]) with any change or worsening of your breathing.  Follow-up in 6 months.     Sirena Pratt CNP  Pulmonary Medicine  [facility]  [phone]

## 2025-02-02 ENCOUNTER — OFFICE VISIT (OUTPATIENT)
Dept: URGENT CARE | Facility: URGENT CARE | Age: 35
End: 2025-02-02
Payer: COMMERCIAL

## 2025-02-02 ENCOUNTER — ANCILLARY PROCEDURE (OUTPATIENT)
Dept: GENERAL RADIOLOGY | Facility: CLINIC | Age: 35
End: 2025-02-02
Attending: NURSE PRACTITIONER
Payer: COMMERCIAL

## 2025-02-02 VITALS
SYSTOLIC BLOOD PRESSURE: 131 MMHG | OXYGEN SATURATION: 97 % | TEMPERATURE: 99.2 F | BODY MASS INDEX: 30.56 KG/M2 | DIASTOLIC BLOOD PRESSURE: 86 MMHG | RESPIRATION RATE: 16 BRPM | WEIGHT: 219.1 LBS | HEART RATE: 87 BPM

## 2025-02-02 DIAGNOSIS — R05.1 ACUTE COUGH: ICD-10-CM

## 2025-02-02 DIAGNOSIS — R06.2 WHEEZING: ICD-10-CM

## 2025-02-02 DIAGNOSIS — J45.41 MODERATE PERSISTENT ASTHMA WITH EXACERBATION: ICD-10-CM

## 2025-02-02 DIAGNOSIS — J06.9 VIRAL URI WITH COUGH: Primary | ICD-10-CM

## 2025-02-02 DIAGNOSIS — R50.9 FEVER, UNSPECIFIED FEVER CAUSE: ICD-10-CM

## 2025-02-02 DIAGNOSIS — R06.02 SOB (SHORTNESS OF BREATH): ICD-10-CM

## 2025-02-02 LAB — DEPRECATED S PYO AG THROAT QL EIA: NEGATIVE

## 2025-02-02 PROCEDURE — 87651 STREP A DNA AMP PROBE: CPT | Performed by: NURSE PRACTITIONER

## 2025-02-02 PROCEDURE — 71046 X-RAY EXAM CHEST 2 VIEWS: CPT | Mod: TC | Performed by: RADIOLOGY

## 2025-02-02 PROCEDURE — 99213 OFFICE O/P EST LOW 20 MIN: CPT | Performed by: NURSE PRACTITIONER

## 2025-02-02 NOTE — Clinical Note
February 2, 2025      Efren Rosas  [id number] [place] [address] [id number]        To Whom It May Concern:    Efren Rosas  was seen on ***.  Please excuse him  until *** due to {WORK EXCUSE:680423}.        Sincerely,        NIRAJ Morris CNP    Electronically signed

## 2025-02-02 NOTE — PROGRESS NOTES
Assessment & Plan      Diagnosis Comments   1. Viral URI with cough    Discussed symptoms of viral infection.  May use OTC treatment such as Mucinex, Robitussin or other antitussive medication.  Vaporizer or humidifier in room would be helpful.  We discussed using throat lozenges to help with throat and cough.  We discussed red flag symptoms that would warrant emergent or urgent evaluation patient verbalized understanding and was in agreement with this plan.        2. Moderate persistent asthma with exacerbation  Streptococcus A Rapid Screen w/Reflex to PCR - Clinic Collect, XR Chest 2 Views, Group A Streptococcus PCR Throat Swab continue inhalers as he has been.  Patient does have a course of prednisone at home to from his pulmonary specialist in case he needs this do not feel that this is necessary at this time.  But we did discuss symptoms that would warrant use.  Per personal read of chest x-ray no indication for pneumonia.      3. Wheezing  Streptococcus A Rapid Screen w/Reflex to PCR - Clinic Collect, XR Chest 2 Views, Group A Streptococcus PCR Throat Swab       4. SOB (shortness of breath)  Streptococcus A Rapid Screen w/Reflex to PCR - Clinic Collect, XR Chest 2 Views, Group A Streptococcus PCR Throat Swab       5. Acute cough  Streptococcus A Rapid Screen w/Reflex to PCR - Clinic Collect, XR Chest 2 Views, Group A Streptococcus PCR Throat Swab       6. Fever, unspecified fever cause  Streptococcus A Rapid Screen w/Reflex to PCR - Clinic Collect, XR Chest 2 Views, Group A Streptococcus PCR Throat Swab Pending strep culture            NIRAJ Morris [facility]    Kelly Schwartz is a 34 year old male who presents to clinic today for the following health issues:  Chief Complaint   Patient presents with    Cough     Onset yesterday, today worsening having cough attacks - have to catch breath afterward. Feels like a elephant is sitting on his head.  Pt son  dx with RSV on Friday. Denied having fever    Dizziness     When standing up feel dizzy, has to stand up slowly.     Pharyngitis     Scratchy feeling in throat onset today.        HPI      URI Adult    Onset of symptoms was 4 day(s) ago.  Course of illness is waxing and waning.    Severity moderate  Current and Associated symptoms: fever, runny nose, cough - non-productive, wheezing, headache, body aches, and fatigue  Treatment measures tried include Tylenol/Ibuprofen, Fluids, and Rest.  Predisposing factors include ill contact: Family member .  History of asthma states he has needed to use his inhaler more frequently.    Review of Systems  Constitutional, HEENT, cardiovascular, pulmonary, gi and gu systems are negative, except as otherwise noted.      Objective    /86 (BP Location: Left arm, Patient Position: Sitting, Cuff Size: Adult Large)   Pulse 87   Temp 99.2  F (37.3  C) (Tympanic)   Wt 99.4 kg (219 lb 1.6 oz)   SpO2 97%   BMI 30.56 kg/m    Physical Exam   GENERAL: alert and no distress  EYES: Eyes grossly normal to inspection, PERRL and conjunctivae and sclerae normal  HENT: normal cephalic/atraumatic, ear canals and TM's normal, nose and mouth without ulcers or lesions, nasal mucosa edematous , rhinorrhea clear, oropharynx clear, oral mucous membranes moist, and tonsillar erythema  NECK: bilateral anterior cervical adenopathy, no asymmetry, masses, or scars, and thyroid normal to palpation  RESP: lungs clear to auscultation - no rales, rhonchi or wheezes and expiratory wheezes throughout  CV: regular rate and rhythm, normal S1 S2, no S3 or S4, no murmur, click or rub, no peripheral edema  ABDOMEN: soft, nontender, no hepatosplenomegaly, no masses and bowel sounds normal  MS: no gross musculoskeletal defects noted, no edema           back pain

## 2025-02-03 LAB — S PYO DNA THROAT QL NAA+PROBE: NOT DETECTED

## 2025-06-11 ASSESSMENT — ASTHMA QUESTIONNAIRES
ACT_TOTALSCORE: 14
QUESTION_5 LAST FOUR WEEKS HOW WOULD YOU RATE YOUR ASTHMA CONTROL: SOMEWHAT CONTROLLED
QUESTION_1 LAST FOUR WEEKS HOW MUCH OF THE TIME DID YOUR ASTHMA KEEP YOU FROM GETTING AS MUCH DONE AT WORK, SCHOOL OR AT HOME: SOME OF THE TIME
QUESTION_3 LAST FOUR WEEKS HOW OFTEN DID YOUR ASTHMA SYMPTOMS (WHEEZING, COUGHING, SHORTNESS OF BREATH, CHEST TIGHTNESS OR PAIN) WAKE YOU UP AT NIGHT OR EARLIER THAN USUAL IN THE MORNING: TWO OR THREE NIGHTS A WEEK
QUESTION_4 LAST FOUR WEEKS HOW OFTEN HAVE YOU USED YOUR RESCUE INHALER OR NEBULIZER MEDICATION (SUCH AS ALBUTEROL): TWO OR THREE TIMES PER WEEK
QUESTION_2 LAST FOUR WEEKS HOW OFTEN HAVE YOU HAD SHORTNESS OF BREATH: THREE TO SIX TIMES A WEEK

## 2025-06-12 ENCOUNTER — TELEPHONE (OUTPATIENT)
Dept: PULMONOLOGY | Facility: CLINIC | Age: 35
End: 2025-06-12

## 2025-06-12 ENCOUNTER — OFFICE VISIT (OUTPATIENT)
Dept: PULMONOLOGY | Facility: CLINIC | Age: 35
End: 2025-06-12
Attending: NURSE PRACTITIONER
Payer: COMMERCIAL

## 2025-06-12 VITALS
DIASTOLIC BLOOD PRESSURE: 69 MMHG | SYSTOLIC BLOOD PRESSURE: 118 MMHG | BODY MASS INDEX: 32.22 KG/M2 | HEART RATE: 82 BPM | WEIGHT: 231 LBS | OXYGEN SATURATION: 97 %

## 2025-06-12 DIAGNOSIS — K21.9 GASTROESOPHAGEAL REFLUX DISEASE WITHOUT ESOPHAGITIS: ICD-10-CM

## 2025-06-12 DIAGNOSIS — J45.50 SEVERE PERSISTENT ASTHMA WITHOUT COMPLICATION (H): Primary | ICD-10-CM

## 2025-06-12 DIAGNOSIS — Z91.09 ENVIRONMENTAL ALLERGIES: ICD-10-CM

## 2025-06-12 DIAGNOSIS — D72.19 OTHER EOSINOPHILIA: ICD-10-CM

## 2025-06-12 RX ORDER — AZITHROMYCIN 250 MG/1
TABLET, FILM COATED ORAL
Qty: 6 TABLET | Refills: 0 | Status: SHIPPED | OUTPATIENT
Start: 2025-06-12 | End: 2025-06-17

## 2025-06-12 RX ORDER — IPRATROPIUM BROMIDE AND ALBUTEROL SULFATE 2.5; .5 MG/3ML; MG/3ML
1 SOLUTION RESPIRATORY (INHALATION) EVERY 6 HOURS PRN
Qty: 180 ML | Refills: 11 | Status: SHIPPED | OUTPATIENT
Start: 2025-06-12

## 2025-06-12 RX ORDER — PREDNISONE 10 MG/1
TABLET ORAL
Qty: 30 TABLET | Refills: 0 | Status: SHIPPED | OUTPATIENT
Start: 2025-06-12 | End: 2025-06-24

## 2025-06-12 NOTE — PROGRESS NOTES
Pulmonary Clinic Follow-Up          Assessment/Plan:     34 year old male with a history of asthma, allergic rhinitis, ROMAN on cpap, GERD - presenting for 6 month follow-up.    Severe persistent asthma  Allergic rhinitis  Eosinophilia  Environmental allergies  Former smoker x15 years, quit 2022.  Asthma diagnosis in childhood, hx of environmental allergies and eosinophilia in past.  Absolute eosinophils 0.5 in 2016, and 0.6 in 2018 and 2019.  He was previously followed by Dr Ellis at [facility], now switching to our clinic.  He has used Qvar, Advair, zafirlukast, montelukast in past.  Montelukast caused worsening GERD symptoms.  He is unsure why he stopped zafirlukast, but found benefit from this.  He has taken prilosec in past with benefit.  Recent diagnosis of ROMAN, now on cpap.  Pulmonary function testing shows normal spirometry, lung volumes, and diffusion capacity.  No recent lung imaging on file.  FeNO in clinic was 19.  Eosinophils 0.3, IgE 15 on 7/25/24.  Previous visit we increased his regimen from Advair to Trelegy Ellipta.  He reports some improvement in symptoms, but recently his breathing, cough, chest congestion has been uncontrolled.      Plan:  - start Dupixent 600mg loading dose, followed by 300mg injection every 2 weeks.    - chest CT, for updated chest imaging.    - alpha-1 antitrypsin deficiency screening - his father was recently diagnosed with emphysema and patient notes he mentioned 'family history of lung issues'.   - continue Trelegy Ellipta (fluticasone/umeclidinium/vilanterol) 200/62.5/25, one inhalation daily, rinse/gargle after use.   - continue Albuterol inhaler or Duonebs PRN.   - continue cpap.  - may need better management of GERD, as this could be exacerbating asthma control as well.  - previously discussed peak flow meter, he has multiple at home, asked him to use this to develop baseline number and so we can monitor.  - Action plan: prednisone 40mg x5 days.      Follow-up  - 3  months      Sirena Pratt CNP  Pulmonary Medicine  [facility]  [phone]       CC:     Asthma follow-up     HPI:     34 year old male with a history of asthma, allergic rhinitis, ROMAN on cpap, GERD - presenting for 6 month follow-up.     in February.  Spring allergies, air pollution - breathing has been a struggle.  Father with COPD, emphysema.  Recently diagnosed.  He was a smoker in past.  Mentions 'family history of lung issues'.   Generic zyrtec is helping some allergy symptoms.               10/9/2024     8:14 PM 12/6/2024     9:03 AM 6/11/2025    12:26 PM   ACT Total Scores   ACT TOTAL SCORE (Goal Greater than or Equal to 20) 21 23  14    In the past 12 months, how many times did you visit the emergency room for your asthma without being admitted to the hospital? 0 0 0   In the past 12 months, how many times were you hospitalized overnight because of your asthma? 0 0 0       Patient-reported          ROS:     A 4-system review was obtained and was negative with the exception of the symptoms endorsed in the HPI.       Medical history:       PMH:  Past Medical History:   Diagnosis Date    Allergic rhinitis, cause unspecified     Attention deficit disorder without mention of hyperactivity     Eosinophilia     Severe persistent asthma (H)        PSH:  Past Surgical History:   Procedure Laterality Date    NO HISTORY OF SURGERY         Allergies:  No Known Allergies    Family Hx:  Family History   Problem Relation Age of Onset    Family History Negative No family hx of     Diabetes No family hx of     Cerebrovascular Disease No family hx of     Hypertension No family hx of     Breast Cancer No family hx of     Cancer - colorectal No family hx of     Prostate Cancer No family hx of     C.A.D. No family hx of     Lipids Father     Cancer Paternal Grandmother        Social Hx:  Social History     Socioeconomic History    Marital status:      Spouse name: Not on file     Number of children: Not on file    Years of education: Not on file    Highest education level: Not on file   Occupational History    Not on file   Tobacco Use    Smoking status: Former     Current packs/day: 0.00     Average packs/day: 0.3 packs/day for 4.0 years (1.0 ttl pk-yrs)     Types: Cigarettes     Start date: 2/15/2017     Quit date: 2/15/2021     Years since quittin.3     Passive exposure: Never    Smokeless tobacco: Never   Vaping Use    Vaping status: Never Used   Substance and Sexual Activity    Alcohol use: No    Drug use: No    Sexual activity: Not on file   Other Topics Concern    Parent/sibling w/ CABG, MI or angioplasty before 65F 55M? Not Asked   Social History Narrative    Not on file     Social Drivers of Health     Financial Resource Strain: Low Risk  (10/9/2024)    Financial Resource Strain     Within the past 12 months, have you or your family members you live with been unable to get utilities (heat, electricity) when it was really needed?: No   Food Insecurity: High Risk (10/9/2024)    Food Insecurity     Within the past 12 months, did you worry that your food would run out before you got money to buy more?: Yes     Within the past 12 months, did the food you bought just not last and you didn t have money to get more?: No   Transportation Needs: Low Risk  (10/9/2024)    Transportation Needs     Within the past 12 months, has lack of transportation kept you from medical appointments, getting your medicines, non-medical meetings or appointments, work, or from getting things that you need?: No   Physical Activity: Insufficiently Active (10/9/2024)    Exercise Vital Sign     Days of Exercise per Week: 4 days     Minutes of Exercise per Session: 30 min   Stress: Stress Concern Present (10/9/2024)    [place] [employer] - Occupational Stress Questionnaire     Feeling of Stress : To some extent   Social Connections: Unknown (10/9/2024)    Social Connection and Isolation  Panel [NHANES]     Frequency of Communication with Friends and Family: Not on file     Frequency of Social Gatherings with Friends and Family: Once a week     Attends Jainism Services: Not on file     Active Member of Clubs or Organizations: Not on file     Attends Club or Organization Meetings: Not on file     Marital Status: Not on file   Interpersonal Safety: Low Risk  (10/14/2024)    Interpersonal Safety     Do you feel physically and emotionally safe where you currently live?: Yes     Within the past 12 months, have you been hit, slapped, kicked or otherwise physically hurt by someone?: No     Within the past 12 months, have you been humiliated or emotionally abused in other ways by your partner or ex-partner?: No   Housing Stability: Low Risk  (10/9/2024)    Housing Stability     Do you have housing? : Yes     Are you worried about losing your housing?: No       Current Meds:  Current Outpatient Medications   Medication Sig Dispense Refill    albuterol (PROAIR HFA/PROVENTIL HFA/VENTOLIN HFA) 108 (90 Base) MCG/ACT inhaler Inhale 2 puffs into the lungs every 6 hours as needed for shortness of breath or wheezing 18 g 11    azithromycin (ZITHROMAX) 250 MG tablet Take 2 tablets (500 mg) by mouth daily for 1 day, THEN 1 tablet (250 mg) daily for 4 days. 6 tablet 0    dupilumab (DUPIXENT) 300 MG/2ML prefilled pen Inject 4 mLs (600 mg) subcutaneously once for 1 dose. 4 mL 0    dupilumab (DUPIXENT) 300 MG/2ML prefilled pen Inject 2 mLs (300 mg) subcutaneously every 14 days. 4 mL 11    Fluticasone-Umeclidin-Vilanterol (TRELEGY ELLIPTA) 200-62.5-25 MCG/ACT oral inhaler Inhale 1 puff into the lungs daily. 84 each 4    IBUPROFEN 200 200 MG OR TABS Take 2 tablets by mouth as needed      ipratropium - albuterol 0.5 mg/2.5 mg/3 mL (DUONEB) 0.5-2.5 (3) MG/3ML neb solution Take 1 vial (3 mLs) by nebulization every 6 hours as needed for shortness of breath or wheezing. 180 mL 11    predniSONE (DELTASONE) 10 MG tablet Take 4  tablets (40 mg) by mouth daily for 3 days, THEN 3 tablets (30 mg) daily for 3 days, THEN 2 tablets (20 mg) daily for 3 days, THEN 1 tablet (10 mg) daily for 3 days. 30 tablet 0    dicyclomine (BENTYL) 20 MG tablet Take 1 tablet (20 mg) by mouth every 6 hours. (Patient not taking: Reported on 6/12/2025) 6 tablet 0    insulin pen needle (32G X 4 MM) 32G X 4 MM miscellaneous Use 1 pen needles daily or as directed. 90 each 1    ondansetron (ZOFRAN ODT) 4 MG ODT tab Take 1 tablet (4 mg) by mouth every 8 hours as needed for nausea. (Patient not taking: Reported on 6/12/2025) 6 tablet 0    Semaglutide-Weight Management (WEGOVY) 1 MG/0.5ML pen Inject 1 mg subcutaneously once a week. 6 mL 3          Physical Exam:     /69 (BP Location: Left arm, Patient Position: Sitting, Cuff Size: Adult Large)   Pulse 82   Wt 104.8 kg (231 lb)   SpO2 97%   BMI 32.22 kg/m    Gen: adult male, appears in NAD  HEENT: clear conjunctivae, moist mucous membranes  CV: RRR, no M/G/R  Resp: CTAB, no focal crackles or wheezes.  Respirations even and unlabored.  On RA.   Skin: no apparent rashes on visible skin  Ext: no cyanosis, clubbing or edema  Neuro: alert and answering questions appropriately         Data:     Labs:  reviewed        Pulmonary Function Testing    7/24/24:      Spirometry Results 6/14/2019 ([facility])  FVC (Pre) 4.69   FVC % Predicted (Pre) 83   FEV1 (Pre) 3.24   FEV1 % Predicted (Pre) 70   FEV1/FVC % (Pre) 69.08   Valid test (Pre) Yes   FVC (Post) 4.73   FVC % Predicted (Post) 84   FEV1 (Post) 3.6   FEV1 % Predicted (Post) 78   FEV1/FVC % (Post) 76.11   POST-bronchodilator FEV1% improvement 11   Valid test (Post) Yes

## 2025-06-12 NOTE — PATIENT INSTRUCTIONS
It was a pleasure to see you in clinic today.   Here is what we discussed:    Start Dupixent injections, first dose is a loading dose with 2 injections.  From there, it is just one injection every 2 weeks.   Have the chest CT done.   Start the prednisone + azithromycin if you need it.   Continue Trelegy Ellipta one puff daily, rinse/gargle after use.   Continue Albuterol inhaler or Duonebs every 4-6 hours as needed for shortness of breath or wheezing.  We are doing a test for a genetic disorder, I will update you on results over [employer].   Call my nurse, Colt ([phone]) with any change or worsening of your breathing.  Follow-up in 3 months.     Sirena Pratt, CNP  Pulmonary Medicine  [facility]  [phone]

## 2025-06-12 NOTE — TELEPHONE ENCOUNTER
PA Initiation    Medication: DUPIXENT 300 MG/2ML SC SOAJ  Insurance Company: [employer] - Phone [phone] Fax [phone]  Pharmacy Filling the Rx:    Filling Pharmacy Phone:    Filling Pharmacy Fax:    Start Date: 6/12/2025    Key: [id number]

## 2025-06-13 NOTE — TELEPHONE ENCOUNTER
Prior Authorization Approval    Medication: DUPIXENT 300 MG/2ML SC SOAJ  Authorization Effective Date: 5/13/2025  Authorization Expiration Date: 7/10/2025  Approved Dose/Quantity: #6mLs per 28 days  Reference #: Key: [id number]   Insurance Company: [employer] - Phone [phone] Fax [phone]  Expected CoPay: $  0.00  CoPay Card Available: Yes    Financial Assistance Needed: Yes?  Which Pharmacy is filling the prescription: [facility] - [address] [place] [address]  Pharmacy Notified: Released rx's  Patient Notified: Yes    Patient must enroll in [facility] before prescription can be processed.

## 2025-06-16 NOTE — TELEPHONE ENCOUNTER
Received paid claim at [place]. Patient states per his insurance contract he can have 2 yosi fills at external pharmacy but then will be restricted to [facility] and would rather have everything there from the start.    COPAY CARD OBTAINED    Medication: DUPIXENT 300 MG/2ML SC SOAJ    Expected Copay: $    Copay card Monthly Max Amount: $    Copay card Annual Amount: $ 10,000

## 2025-06-18 LAB
A1AD NUMERIC: 114 MG/DL (ref 90–200)
A1AD STRING: NORMAL

## 2025-06-20 ENCOUNTER — RESULTS FOLLOW-UP (OUTPATIENT)
Dept: PULMONOLOGY | Facility: CLINIC | Age: 35
End: 2025-06-20

## 2025-06-29 ENCOUNTER — HOSPITAL ENCOUNTER (OUTPATIENT)
Dept: CT IMAGING | Facility: CLINIC | Age: 35
Discharge: HOME | End: 2025-06-29
Attending: NURSE PRACTITIONER | Admitting: NURSE PRACTITIONER
Payer: COMMERCIAL

## 2025-06-29 DIAGNOSIS — D72.19 OTHER EOSINOPHILIA: ICD-10-CM

## 2025-06-29 DIAGNOSIS — J45.50 SEVERE PERSISTENT ASTHMA WITHOUT COMPLICATION (H): ICD-10-CM

## 2025-06-29 PROCEDURE — 71250 CT THORAX DX C-: CPT

## 2025-08-06 ENCOUNTER — TELEPHONE (OUTPATIENT)
Dept: PULMONOLOGY | Facility: CLINIC | Age: 35
End: 2025-08-06
Payer: COMMERCIAL

## 2025-08-25 ENCOUNTER — OFFICE VISIT (OUTPATIENT)
Dept: PODIATRY | Facility: CLINIC | Age: 35
End: 2025-08-25
Payer: COMMERCIAL

## 2025-08-25 VITALS — BODY MASS INDEX: 32.34 KG/M2 | WEIGHT: 231 LBS | HEIGHT: 71 IN

## 2025-08-25 DIAGNOSIS — M72.2 PLANTAR FASCIITIS, LEFT: Primary | ICD-10-CM

## 2025-08-25 RX ORDER — MELOXICAM 7.5 MG/1
7.5 TABLET ORAL DAILY
Qty: 30 TABLET | Refills: 1 | Status: SHIPPED | OUTPATIENT
Start: 2025-08-25

## 2025-08-25 ASSESSMENT — PAIN SCALES - GENERAL: PAINLEVEL_OUTOF10: SEVERE PAIN (8)
